# Patient Record
Sex: FEMALE | Race: WHITE | NOT HISPANIC OR LATINO | Employment: FULL TIME | URBAN - METROPOLITAN AREA
[De-identification: names, ages, dates, MRNs, and addresses within clinical notes are randomized per-mention and may not be internally consistent; named-entity substitution may affect disease eponyms.]

---

## 2017-04-04 ASSESSMENT — ENCOUNTER SYMPTOMS
SEIZURES: 0
HEADACHES: 0
WEAKNESS: 1
TREMORS: 0
TINGLING: 1
LOSS OF CONSCIOUSNESS: 0
PARALYSIS: 0
DIZZINESS: 0
DISTURBANCES IN COORDINATION: 0
NUMBNESS: 1
MEMORY LOSS: 0
SPEECH CHANGE: 0

## 2017-04-06 ASSESSMENT — ENCOUNTER SYMPTOMS
WEIGHT GAIN: 0
HYPERTENSION: 0
RECTAL BLEEDING: 0
ARTHRALGIAS: 0
INCREASED ENERGY: 0
ORTHOPNEA: 0
WEAKNESS: 1
NUMBNESS: 1
NERVOUS/ANXIOUS: 0
TACHYCARDIA: 0
COUGH: 0
BREAST MASS: 0
NECK MASS: 0
DISTURBANCES IN COORDINATION: 0
ABDOMINAL PAIN: 0
EYE PAIN: 0
VOMITING: 0
TREMORS: 0
DIARRHEA: 0
POOR WOUND HEALING: 0
PARALYSIS: 0
SNORES LOUDLY: 0
SLEEP DISTURBANCES DUE TO BREATHING: 0
FEVER: 0
SPUTUM PRODUCTION: 0
BLOATING: 0
BLOOD IN STOOL: 0
SINUS PAIN: 0
EYE WATERING: 0
SPEECH CHANGE: 0
HEMOPTYSIS: 0
COUGH DISTURBING SLEEP: 0
TROUBLE SWALLOWING: 0
HEARTBURN: 0
MUSCLE WEAKNESS: 0
EXERCISE INTOLERANCE: 0
SYNCOPE: 0
CLAUDICATION: 0
EYE IRRITATION: 0
CHILLS: 0
DOUBLE VISION: 0
SKIN CHANGES: 0
HALLUCINATIONS: 0
SHORTNESS OF BREATH: 0
POLYDIPSIA: 0
ALTERED TEMPERATURE REGULATION: 0
DECREASED APPETITE: 0
DIZZINESS: 0
CONSTIPATION: 0
NECK PAIN: 0
BACK PAIN: 0
LOSS OF CONSCIOUSNESS: 0
HEMATURIA: 0
NIGHT SWEATS: 0
MYALGIAS: 0
DYSURIA: 0
EYE REDNESS: 0
SMELL DISTURBANCE: 0
HEADACHES: 0
HOT FLASHES: 0
DYSPNEA ON EXERTION: 0
WEIGHT LOSS: 0
HYPOTENSION: 0
STIFFNESS: 0
MEMORY LOSS: 0
LEG SWELLING: 0
RECTAL PAIN: 0
JOINT SWELLING: 0
PALPITATIONS: 0
INSOMNIA: 0
DEPRESSION: 0
JAUNDICE: 0
BOWEL INCONTINENCE: 0
WHEEZING: 0
DECREASED LIBIDO: 0
DIFFICULTY URINATING: 0
NAIL CHANGES: 0
BRUISES/BLEEDS EASILY: 0
LIGHT-HEADEDNESS: 0
POSTURAL DYSPNEA: 0
FLANK PAIN: 0
PANIC: 0
POLYPHAGIA: 0
SINUS CONGESTION: 0
SEIZURES: 0
BREAST PAIN: 0
DECREASED CONCENTRATION: 0
TASTE DISTURBANCE: 0
FATIGUE: 0
MUSCLE CRAMPS: 0
RESPIRATORY PAIN: 0
SWOLLEN GLANDS: 0
LEG PAIN: 0
HOARSE VOICE: 0
SORE THROAT: 0
NAUSEA: 0
TINGLING: 1

## 2017-04-06 NOTE — PROGRESS NOTES
Follow Up Notes on Referred Patient    Date: 2017       Dr. Loli Wilson MD  [facility]  [address] [address]       RE: Melly Huston  : 1965  NELLI: 2017    Dear Dr. 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 She is here today for a surveillance visit.      History-to-date:   2013: The patient is a 47 year old  female h/o endometrial hyperplasia (complex without atypia on first biopsy, normal on second biopsy) who presented for annual exam 13. Menses were previously managed with Mirena IUD. Over last handful of months, her bleeding with menses has increased to an amount similar to that occuring at the time of the initial IUD insertion. Prior to IUD, bleeding was irregular and almost continuous. At the time of IUD removal and replacement she underwent endometrial biopsy that returned adenocarcinoma; favor endometrial endometrioid FIGO grade II (see pathology report for details).   RADIATION THERAPY AND DOSE: HDR intracavitary brachytherapy, total dose 21 Gy in 3 fractions     13: Sexually active and using lubrication prn. Some deep dyspareunia. Using dilator. No bleeding. Some urinary cramping and constipation but overall symptoms are improving. Multiple family cancers, considering genetic consult. Screening current. Notes intermittent sore throat x months. History of heartburn. Mild lymphedema improving with exercise. Lymphedema Clinic has been helpful. Brother with diagnosis of prostate cancer and patient is very involved and working full time.   3/12/2014: ASCUS negative high-risk HPV. LIZ  14: No abdominal bloating, constipation, diarrhea, pain, vaginal or rectal bleeding, cough or dyspnea. Night time  awakenings and hot flashes. Pt has tried melatonin, benadryl, bed time teas. Overall wishes to avoid medications. Improved sexual response and very mild dyspareunia much improved since last visit. Some mild lower leg lymphedema with occasional use of compression stockings. Bilateral knee pain with reduced activity which has been chronic but most disturbing to pt. Pt considering knee replacement. History of blood clot secondary to surgery with negative coag testing. Priolosec d/c and pt has rare reflux symptoms.      9/17/14: Paps no longer indicated due to negative history of cervical cancer. New lower right-sided pelvic/abdominal pain x 2-3 weeks. No constipation or urinary symptoms. Sexual response had improved then now has declined again. Some increased lymphedema recently but then improved with compression and elevation. Continues to be physically active.      9/18/14: CT IMPRESSION: Simple cyst left pelvic wall fluid collection which most  likely represents post operative seroma. Postsurgical changes of  bilateral salpingo-oophorectomy and hysterectomy.     10/2/14: Impression:  Successful CT guided aspiration of left pelvic fluid collection. 20 cc of yellow thin fluid aspirated and submitted for analysis.    Pelvis, cyst fluid:  -No evidence of malignancy  -Polymorphous lymphocytes (see Comment) Specimen Adequacy: Satisfactory for evaluation.    COMMENT:  Cytologic preparations demonstrate a polymorphous mixture of small and medium lymphocytes. In the setting of previous abdominal lymphadenectomy in a patient who presents with an abdominal cyst, the findings are consistent with a lymphocele.      6/23/15: CT ap Impression:  1. No evidence for recurrent or metastatic disease in the abdomen or pelvis.  2. Previously identified left pelvic wall lymphocele is no longer present.  3. Right lower lobe 3 mm subpleural nodule, unchanged since 8/5/2013.      Today she comes to clinic feeling well. She denies any vaginal  bleeding, no changes in her bowel or bladder habits, no nausea/emesis, no lower extremity edema, and no difficulties eating or sleeping. She denies any abdominal discomfort/bloating, no fevers or chills, and no chest pain or shortness of breath. She is having some right shoulder discomfort causing some tingling/numbness. She is current with her health screenings and is using a lubricant for intercourse which helps. She has seen a PCP since her last visit and reports her cholesterol is a little high so she has been changing her diet and is working on exercise. She is using her dilator about once a month.       Review of Systems     Constitutional:  Negative for fever, chills, weight loss, weight gain, fatigue, decreased appetite, night sweats, recent stressors, height gain, height loss, post-operative complications, incisional pain, hallucinations, increased energy, hyperactivity and confused.   HENT:  Negative for ear pain, hearing loss, tinnitus, nosebleeds, trouble swallowing, hoarse voice, mouth sores, sore throat, ear discharge, tooth pain, gum tenderness, taste disturbance, smell disturbance, hearing aid, bleeding gums, dry mouth, sinus pain, sinus congestion and neck mass.    Eyes:  Negative for double vision, pain, redness, eye pain, decreased vision, eye watering, eye bulging, eye dryness, flashing lights, spots, floaters, strabismus, tunnel vision, jaundice and eye irritation.   Respiratory:   Negative for cough, hemoptysis, sputum production, shortness of breath, wheezing, sleep disturbances due to breathing, snores loudly, respiratory pain, dyspnea on exertion, cough disturbing sleep and postural dyspnea.    Cardiovascular:  Negative for chest pain, dyspnea on exertion, palpitations, orthopnea, claudication, leg swelling, fingers/toes turn blue, hypertension, hypotension, syncope, history of heart murmur, chest pain on exertion, chest pain at rest, pacemaker, few scattered varicosities, leg pain, sleep  disturbances due to breathing, tachycardia, light-headedness, exercise intolerance and edema.   Gastrointestinal:  Negative for heartburn, nausea, vomiting, abdominal pain, diarrhea, constipation, blood in stool, melena, rectal pain, bloating, hemorrhoids, bowel incontinence, jaundice, rectal bleeding, coffee ground emesis and change in stool.   Genitourinary:  Negative for bladder incontinence, dysuria, urgency, hematuria, flank pain, vaginal discharge, difficulty urinating, genital sores, dyspareunia, decreased libido, nocturia, voiding less frequently, arousal difficulty, abnormal vaginal bleeding, excessive menstruation, menstrual changes, hot flashes, vaginal dryness and postmenopausal bleeding.   Musculoskeletal:  Negative for myalgias, back pain, joint swelling, arthralgias, stiffness, muscle cramps, neck pain, bone pain, muscle weakness and fracture.   Skin:  Negative for nail changes, itching, poor wound healing, rash, hair changes, skin changes, acne, warts, poor wound healing, scarring, flaky skin, Raynaud's phenomenon, sensitivity to sunlight and skin thickening.   Neurological:  Positive for tingling, weakness and numbness. Negative for dizziness, tremors, speech change, seizures, loss of consciousness, light-headedness, headaches, disturbances in coordination, memory loss, difficulty walking and paralysis.   Endo/Heme:  Negative for anemia, swollen glands and bruises/bleeds easily.   Psychiatric/Behavioral:  Negative for depression, hallucinations, memory loss, decreased concentration, mood swings and panic attacks.    Breast:  Negative for breast discharge, breast mass, breast pain and nipple retraction.   Endocrine:  Negative for altered temperature regulation, polyphagia, polydipsia, unwanted hair growth and change in facial hair.          Past Medical History:    Past Medical History:   Diagnosis Date     Arthritis      Blood clot in vein 2010    negative coag testing.      Cough      Endometrial  cancer (H)      Endometrial hyperplasia 2009     Gastro-oesophageal reflux disease      PONV (postoperative nausea and vomiting)      Shortness of breath          Past Surgical History:    Past Surgical History:   Procedure Laterality Date     ARTHROPLASTY KNEE Left 3/24/2015    Procedure: ARTHROPLASTY KNEE;  Surgeon: Magan No MD;  Location:  OR     DAVINCI HYSTERECTOMY TOTAL, BILATERAL SALPINGO-OOPHORECTOMY, NODE DISSECTION, COMBINED  8/19/2013    Procedure: COMBINED DAVINCI HYSTERECTOMY TOTAL, SALPINGO-OOPHORECTOMY, NODE DISSECTION;  Davinci Total Laparoscopic Hysterectomy, Bilateral Salpingo-Oophorectomy, Bilateral pelvic and periaortic Lymph Node Dissection, cystoscopy and washings.  Anesthesia General with Pain block ;  Surgeon: Bonny Cruz MD;  Location: [facility] OR     KNEE SURGERY  1998    left acl     REMOVE HARDWARE KNEE  3/24/2015    Procedure: REMOVE HARDWARE KNEE;  Surgeon: Magan No MD;  Location:  OR     ROTATOR CUFF REPAIR RT/LT  2012    Right     WRIST SURGERY  2011    blood clot, right         There are no preventive care reminders to display for this patient.    Current Medications:     Current Outpatient Prescriptions   Medication Sig Dispense Refill     Multiple Vitamins-Minerals (MULTIVITAMIN PO) Take by mouth daily       VITAMIN D, CHOLECALCIFEROL, PO Take by mouth daily       calcium carbonate (OS-MORRIS 500 MG [place]. CA) 500 MG tablet Take 500 mg by mouth 2 times daily           Allergies:      No Known Allergies     Social History:     Social History   Substance Use Topics     Smoking status: Former Smoker     Packs/day: 0.50     Years: 5.00     Quit date: 1/1/1990     Smokeless tobacco: Never Used     Alcohol use Yes      Comment: 1-2 drinks/day       History   Drug Use No         Family History:       Family History   Problem Relation Age of Onset     Arthritis Mother      Arthritis Father      Hypertension Brother      CEREBROVASCULAR DISEASE Mother      Peptic  Ulcer Disease Father      Lipids Mother      Lipids Father      DIABETES Mother      CANCER Maternal Aunt      brain cancer, lung cancer     CANCER Brother       brother, prostate     CANCER Mother      kidney     CANCER Paternal Uncle      prostate x 2         Physical Exam:     /77  Pulse 83  Temp 97  F (36.1  C) (Oral)  Resp 16  Wt 70.6 kg (155 lb 9.6 oz)  LMP 07/10/2013  SpO2 96%  BMI 26.76 kg/m2  Body mass index is 26.76 kg/(m^2).    General Appearance: healthy and alert, no distress     HEENT: no thyromegaly, no palpable nodules or masses        Cardiovascular: regular rate and rhythm, no gallops, rubs or murmurs     Respiratory: lungs clear, no rales, rhonchi or wheezes, normal diaphragmatic excursion    Musculoskeletal: extremities non tender and without edema    Skin: no lesions or rashes     Neurological: normal gait, no gross defects     Psychiatric: appropriate mood and affect                               Hematological: normal cervical, supraclavicular and inguinal lymph nodes     Gastrointestinal:       abdomen soft, non-tender, non-distended, no organomegaly or masses    Genitourinary: External genitalia and urethral meatus appears normal.  Vagina is smooth without nodularity or masses.  Cervix surgically absent.  Bimanual exam reveal no masses, nodularity or fullness.  Recto-vaginal exam confirms these findings.      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 She is here today for a surveillance visit.     15 minutes were spent with this patient, over 50% of that time was spent in symptom management, treatment planning and in counseling and coordination of care.      Plan:     1.)        Patient to RTC in 6 months for her next surveillance visit; she will continue to be seen every 6 months until  10/2018. Continue to use her dilator. Reviewed signs and symptoms for when she should contact the clinic or seek additional care. Patient to contact the clinic with any questions or concerns in the interim.     2.) Genetic risk factors were assessed and the patient does not meet the qualifications for a referral.      3.) Labs and/or tests ordered include:  None today.      4.) Health maintenance issues addressed today include annual health maintenance and non-gynecologic issues with PCP.    JUAN C Schumacher, NP-BC, ANP-BC  Women's Health Nurse Practitioner  Adult Nurse Pracitioner  Gynecologic Oncology          CC  Patient Care Team:  Loli Gonzalez MD as MD (OB/Gyn)  Teresa Johnson APRN CNP (Nurse Practitioner - Women's Health)  LOLI GONZALEZ

## 2017-04-07 ENCOUNTER — ONCOLOGY VISIT (OUTPATIENT)
Dept: ONCOLOGY | Facility: CLINIC | Age: 52
End: 2017-04-07
Attending: NURSE PRACTITIONER
Payer: COMMERCIAL

## 2017-04-07 VITALS
SYSTOLIC BLOOD PRESSURE: 121 MMHG | WEIGHT: 155.6 LBS | TEMPERATURE: 97 F | OXYGEN SATURATION: 96 % | DIASTOLIC BLOOD PRESSURE: 77 MMHG | RESPIRATION RATE: 16 BRPM | BODY MASS INDEX: 26.76 KG/M2 | HEART RATE: 83 BPM

## 2017-04-07 DIAGNOSIS — C54.1 ENDOMETRIAL CANCER (H): Primary | ICD-10-CM

## 2017-04-07 PROCEDURE — 99212 OFFICE O/P EST SF 10 MIN: CPT

## 2017-04-07 PROCEDURE — 99213 OFFICE O/P EST LOW 20 MIN: CPT | Mod: ZP | Performed by: NURSE PRACTITIONER

## 2017-04-07 ASSESSMENT — PAIN SCALES - GENERAL: PAINLEVEL: NO PAIN (0)

## 2017-04-07 NOTE — LETTER
2017       RE: Melly Huston  [address]  [facility] [address]     Dear Colleague,    Thank you for referring your patient, Melly Huston, to the [facility]. Please see a copy of my visit note below.                Follow Up Notes on Referred Patient    Date: 2017       Dr. Loli Wilson MD  [facility]  [address]  [place] [address]       RE: Melly Huston  : 1965  NELLI: 2017    Dear Dr. 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 She is here today for a surveillance visit.      History-to-date:   2013: The patient is a 47 year old  female h/o endometrial hyperplasia (complex without atypia on first biopsy, normal on second biopsy) who presented for annual exam 13. Menses were previously managed with Mirena IUD. Over last handful of months, her bleeding with menses has increased to an amount similar to that occuring at the time of the initial IUD insertion. Prior to IUD, bleeding was irregular and almost continuous. At the time of IUD removal and replacement she underwent endometrial biopsy that returned adenocarcinoma; favor endometrial endometrioid FIGO grade II (see pathology report for details).   RADIATION THERAPY AND DOSE: HDR intracavitary brachytherapy, total dose 21 Gy in 3 fractions     13: Sexually active and using lubrication prn. Some deep dyspareunia. Using dilator. No bleeding. Some urinary cramping and constipation but overall symptoms are improving. Multiple family cancers, considering genetic consult. Screening current. Notes intermittent sore throat x months. History of heartburn. Mild lymphedema improving with exercise. Lymphedema Clinic has been helpful. Brother with  diagnosis of prostate cancer and patient is very involved and working full time.   3/12/2014: ASCUS negative high-risk HPV. LIZ  6/9/14: No abdominal bloating, constipation, diarrhea, pain, vaginal or rectal bleeding, cough or dyspnea. Night time awakenings and hot flashes. Pt has tried melatonin, benadryl, bed time teas. Overall wishes to avoid medications. Improved sexual response and very mild dyspareunia much improved since last visit. Some mild lower leg lymphedema with occasional use of compression stockings. Bilateral knee pain with reduced activity which has been chronic but most disturbing to pt. Pt considering knee replacement. History of blood clot secondary to surgery with negative coag testing. Priolosec d/c and pt has rare reflux symptoms.      9/17/14: Paps no longer indicated due to negative history of cervical cancer. New lower right-sided pelvic/abdominal pain x 2-3 weeks. No constipation or urinary symptoms. Sexual response had improved then now has declined again. Some increased lymphedema recently but then improved with compression and elevation. Continues to be physically active.      9/18/14: CT IMPRESSION: Simple cyst left pelvic wall fluid collection which most  likely represents post operative seroma. Postsurgical changes of  bilateral salpingo-oophorectomy and hysterectomy.     10/2/14: Impression:  Successful CT guided aspiration of left pelvic fluid collection. 20 cc of yellow thin fluid aspirated and submitted for analysis.    Pelvis, cyst fluid:  -No evidence of malignancy  -Polymorphous lymphocytes (see Comment) Specimen Adequacy: Satisfactory for evaluation.    COMMENT:  Cytologic preparations demonstrate a polymorphous mixture of small and medium lymphocytes. In the setting of previous abdominal lymphadenectomy in a patient who presents with an abdominal cyst, the findings are consistent with a lymphocele.      6/23/15: CT ap Impression:  1. No evidence for recurrent or metastatic  disease in the abdomen or pelvis.  2. Previously identified left pelvic wall lymphocele is no longer present.  3. Right lower lobe 3 mm subpleural nodule, unchanged since 8/5/2013.      Today she comes to clinic feeling well. She denies any vaginal bleeding, no changes in her bowel or bladder habits, no nausea/emesis, no lower extremity edema, and no difficulties eating or sleeping. She denies any abdominal discomfort/bloating, no fevers or chills, and no chest pain or shortness of breath. She is having some right shoulder discomfort causing some tingling/numbness. She is current with her health screenings and is using a lubricant for intercourse which helps. She has seen a PCP since her last visit and reports her cholesterol is a little high so she has been changing her diet and is working on exercise. She is using her dilator about once a month.       Review of Systems     Constitutional:  Negative for fever, chills, weight loss, weight gain, fatigue, decreased appetite, night sweats, recent stressors, height gain, height loss, post-operative complications, incisional pain, hallucinations, increased energy, hyperactivity and confused.   HENT:  Negative for ear pain, hearing loss, tinnitus, nosebleeds, trouble swallowing, hoarse voice, mouth sores, sore throat, ear discharge, tooth pain, gum tenderness, taste disturbance, smell disturbance, hearing aid, bleeding gums, dry mouth, sinus pain, sinus congestion and neck mass.    Eyes:  Negative for double vision, pain, redness, eye pain, decreased vision, eye watering, eye bulging, eye dryness, flashing lights, spots, floaters, strabismus, tunnel vision, jaundice and eye irritation.   Respiratory:   Negative for cough, hemoptysis, sputum production, shortness of breath, wheezing, sleep disturbances due to breathing, snores loudly, respiratory pain, dyspnea on exertion, cough disturbing sleep and postural dyspnea.    Cardiovascular:  Negative for chest pain, dyspnea on  exertion, palpitations, orthopnea, claudication, leg swelling, fingers/toes turn blue, hypertension, hypotension, syncope, history of heart murmur, chest pain on exertion, chest pain at rest, pacemaker, few scattered varicosities, leg pain, sleep disturbances due to breathing, tachycardia, light-headedness, exercise intolerance and edema.   Gastrointestinal:  Negative for heartburn, nausea, vomiting, abdominal pain, diarrhea, constipation, blood in stool, melena, rectal pain, bloating, hemorrhoids, bowel incontinence, jaundice, rectal bleeding, coffee ground emesis and change in stool.   Genitourinary:  Negative for bladder incontinence, dysuria, urgency, hematuria, flank pain, vaginal discharge, difficulty urinating, genital sores, dyspareunia, decreased libido, nocturia, voiding less frequently, arousal difficulty, abnormal vaginal bleeding, excessive menstruation, menstrual changes, hot flashes, vaginal dryness and postmenopausal bleeding.   Musculoskeletal:  Negative for myalgias, back pain, joint swelling, arthralgias, stiffness, muscle cramps, neck pain, bone pain, muscle weakness and fracture.   Skin:  Negative for nail changes, itching, poor wound healing, rash, hair changes, skin changes, acne, warts, poor wound healing, scarring, flaky skin, Raynaud's phenomenon, sensitivity to sunlight and skin thickening.   Neurological:  Positive for tingling, weakness and numbness. Negative for dizziness, tremors, speech change, seizures, loss of consciousness, light-headedness, headaches, disturbances in coordination, memory loss, difficulty walking and paralysis.   Endo/Heme:  Negative for anemia, swollen glands and bruises/bleeds easily.   Psychiatric/Behavioral:  Negative for depression, hallucinations, memory loss, decreased concentration, mood swings and panic attacks.    Breast:  Negative for breast discharge, breast mass, breast pain and nipple retraction.   Endocrine:  Negative for altered temperature  regulation, polyphagia, polydipsia, unwanted hair growth and change in facial hair.          Past Medical History:    Past Medical History:   Diagnosis Date     Arthritis      Blood clot in vein 2010    negative coag testing.      Cough      Endometrial cancer (H)      Endometrial hyperplasia 2009     Gastro-oesophageal reflux disease      PONV (postoperative nausea and vomiting)      Shortness of breath          Past Surgical History:    Past Surgical History:   Procedure Laterality Date     ARTHROPLASTY KNEE Left 3/24/2015    Procedure: ARTHROPLASTY KNEE;  Surgeon: Magan No MD;  Location:  OR     DAVINCI HYSTERECTOMY TOTAL, BILATERAL SALPINGO-OOPHORECTOMY, NODE DISSECTION, COMBINED  8/19/2013    Procedure: COMBINED DAVINCI HYSTERECTOMY TOTAL, SALPINGO-OOPHORECTOMY, NODE DISSECTION;  Davinci Total Laparoscopic Hysterectomy, Bilateral Salpingo-Oophorectomy, Bilateral pelvic and periaortic Lymph Node Dissection, cystoscopy and washings.  Anesthesia General with Pain block ;  Surgeon: Bonny Cruz MD;  Location: [facility] OR     KNEE SURGERY  1998    left acl     REMOVE HARDWARE KNEE  3/24/2015    Procedure: REMOVE HARDWARE KNEE;  Surgeon: Magan No MD;  Location:  OR     ROTATOR CUFF REPAIR RT/LT  2012    Right     WRIST SURGERY  2011    blood clot, right         There are no preventive care reminders to display for this patient.    Current Medications:     Current Outpatient Prescriptions   Medication Sig Dispense Refill     Multiple Vitamins-Minerals (MULTIVITAMIN PO) Take by mouth daily       VITAMIN D, CHOLECALCIFEROL, PO Take by mouth daily       calcium carbonate (OS-MORRIS 500 MG [place]. CA) 500 MG tablet Take 500 mg by mouth 2 times daily           Allergies:      No Known Allergies     Social History:     Social History   Substance Use Topics     Smoking status: Former Smoker     Packs/day: 0.50     Years: 5.00     Quit date: 1/1/1990     Smokeless tobacco: Never Used     Alcohol use Yes       Comment: 1-2 drinks/day       History   Drug Use No         Family History:       Family History   Problem Relation Age of Onset     Arthritis Mother      Arthritis Father      Hypertension Brother      CEREBROVASCULAR DISEASE Mother      Peptic Ulcer Disease Father      Lipids Mother      Lipids Father      DIABETES Mother      CANCER Maternal Aunt      brain cancer, lung cancer     CANCER Brother       brother, prostate     CANCER Mother      kidney     CANCER Paternal Uncle      prostate x 2         Physical Exam:     /77  Pulse 83  Temp 97  F (36.1  C) (Oral)  Resp 16  Wt 70.6 kg (155 lb 9.6 oz)  LMP 07/10/2013  SpO2 96%  BMI 26.76 kg/m2  Body mass index is 26.76 kg/(m^2).    General Appearance: healthy and alert, no distress     HEENT: no thyromegaly, no palpable nodules or masses        Cardiovascular: regular rate and rhythm, no gallops, rubs or murmurs     Respiratory: lungs clear, no rales, rhonchi or wheezes, normal diaphragmatic excursion    Musculoskeletal: extremities non tender and without edema    Skin: no lesions or rashes     Neurological: normal gait, no gross defects     Psychiatric: appropriate mood and affect                               Hematological: normal cervical, supraclavicular and inguinal lymph nodes     Gastrointestinal:       abdomen soft, non-tender, non-distended, no organomegaly or masses    Genitourinary: External genitalia and urethral meatus appears normal.  Vagina is smooth without nodularity or masses.  Cervix surgically absent.  Bimanual exam reveal no masses, nodularity or fullness.  Recto-vaginal exam confirms these findings.      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 She is here today for a surveillance visit.     15 minutes were spent with this patient,  over 50% of that time was spent in symptom management, treatment planning and in counseling and coordination of care.      Plan:     1.)        Patient to RTC in 6 months for her next surveillance visit; she will continue to be seen every 6 months until 10/2018. Continue to use her dilator. Reviewed signs and symptoms for when she should contact the clinic or seek additional care. Patient to contact the clinic with any questions or concerns in the interim.     2.) Genetic risk factors were assessed and the patient does not meet the qualifications for a referral.      3.) Labs and/or tests ordered include:  None today.      4.) Health maintenance issues addressed today include annual health maintenance and non-gynecologic issues with PCP.    JUAN C Schumacher, WHNP-BC, ANP-BC  Women's Health Nurse Practitioner  Adult Nurse Pracitioner  Gynecologic Oncology          CC  Patient Care Team:  Loli Wilson MD as MD (OB/Gyn)

## 2017-04-07 NOTE — NURSING NOTE
"Melly Huston is a 51 year old female who presents for:  Chief Complaint   Patient presents with     Oncology Clinic Visit     Endometrial Cancer        Initial Vitals:  /77  Pulse 83  Temp 97  F (36.1  C) (Oral)  Resp 16  Wt 70.6 kg (155 lb 9.6 oz)  LMP 07/10/2013  SpO2 96%  BMI 26.76 kg/m2 Estimated body mass index is 26.76 kg/(m^2) as calculated from the following:    Height as of 12/20/16: 1.624 m (5' 3.94\").    Weight as of this encounter: 70.6 kg (155 lb 9.6 oz).. Body surface area is 1.78 meters squared. BP completed using cuff size: regular  No Pain (0) Patient's last menstrual period was 07/10/2013. Allergies and medications reviewed.     Medications: Medication refills not needed today.  Pharmacy name entered into [employer]: [facility] [place], [place] - [address] [place] [address] [phone]    Comments: Patient is not having any pain today.     6 minutes for nursing intake (face to face time)   Karloyn Duncan CMA       "

## 2017-04-07 NOTE — MR AVS SNAPSHOT
After Visit Summary   4/7/2017    Melly Huston    MRN: [id number]           Patient Information     Date Of Birth          1965        Visit Information        Provider Department      4/7/2017 7:00 AM Teresa Johnson APRN CNP [facility]        Today's Diagnoses     Endometrial cancer (H)    -  1       Follow-ups after your visit        Follow-up notes from your care team     Return in about 6 months (around 10/7/2017).      Who to contact     If you have questions or need follow up information about today's clinic visit or your schedule please contact [facility] directly at [phone].  Normal or non-critical lab and imaging results will be communicated to you by MyChart, letter or phone within 4 business days after the clinic has received the results. If you do not hear from us within 7 days, please contact the clinic through [employer] or phone. If you have a critical or abnormal lab result, we will notify you by phone as soon as possible.  Submit refill requests through [employer] or call your pharmacy and they will forward the refill request to us. Please allow 3 business days for your refill to be completed.          Additional Information About Your Visit        MyChart Information     [employer] gives you secure access to your electronic health record. If you see a primary care provider, you can also send messages to your care team and make appointments. If you have questions, please call your primary care clinic.  If you do not have a primary care provider, please call [phone] and they will assist you.        Care EveryWhere ID     This is your Care EveryWhere ID. This could be used by other organizations to access your [place] medical records  [id number]        Your Vitals Were     Pulse Temperature Respirations Last Period Pulse Oximetry BMI (Body Mass Index)    83 97  F (36.1  C) (Oral) 16 07/10/2013 96% 26.76 kg/m2       Blood  Pressure from Last 3 Encounters:   04/07/17 121/77   12/20/16 121/85   09/22/16 121/83    Weight from Last 3 Encounters:   04/07/17 70.6 kg (155 lb 9.6 oz)   12/20/16 71.1 kg (156 lb 11.2 oz)   09/22/16 71 kg (156 lb 9.6 oz)              Today, you had the following     No orders found for display         Today's Medication Changes          These changes are accurate as of: 4/7/17  7:34 AM.  If you have any questions, ask your nurse or doctor.               Stop taking these medicines if you haven't already. Please contact your care team if you have questions.     albuterol (5 MG/ML) 0.5% neb solution   Commonly known as:  PROVENTIL   Stopped by:  Teresa Johnson APRN CNP                    Primary Care Provider    None Specified       No primary provider on file.        Thank you!     Thank you for choosing [facility]  for your care. Our goal is always to provide you with excellent care. Hearing back from our patients is one way we can continue to improve our services. Please take a few minutes to complete the written survey that you may receive in the mail after your visit with us. Thank you!             Your Updated Medication List - Protect others around you: Learn how to safely use, store and throw away your medicines at www.disposemymeds.org.          This list is accurate as of: 4/7/17  7:34 AM.  Always use your most recent med list.                   Brand Name Dispense Instructions for use    calcium carbonate 500 MG tablet    OS-MORRIS 500 mg [place]. [place]     Take 500 mg by mouth 2 times daily       MULTIVITAMIN PO      Take by mouth daily       VITAMIN D (CHOLECALCIFEROL) PO      Take by mouth daily

## 2017-10-13 ASSESSMENT — ENCOUNTER SYMPTOMS
SINUS CONGESTION: 1
NECK MASS: 0
HOARSE VOICE: 1
WHEEZING: 1
TROUBLE SWALLOWING: 1
SPUTUM PRODUCTION: 1
SMELL DISTURBANCE: 0
SHORTNESS OF BREATH: 1
COUGH DISTURBING SLEEP: 1
HEMOPTYSIS: 0
SNORES LOUDLY: 0
COUGH: 1
SINUS PAIN: 1
DYSPNEA ON EXERTION: 1
RESPIRATORY PAIN: 0
TASTE DISTURBANCE: 0
SORE THROAT: 1
POSTURAL DYSPNEA: 1

## 2017-10-26 ENCOUNTER — ONCOLOGY VISIT (OUTPATIENT)
Dept: ONCOLOGY | Facility: CLINIC | Age: 52
End: 2017-10-26
Attending: NURSE PRACTITIONER
Payer: COMMERCIAL

## 2017-10-26 VITALS
SYSTOLIC BLOOD PRESSURE: 128 MMHG | BODY MASS INDEX: 27.66 KG/M2 | RESPIRATION RATE: 16 BRPM | TEMPERATURE: 98.6 F | DIASTOLIC BLOOD PRESSURE: 87 MMHG | HEIGHT: 64 IN | OXYGEN SATURATION: 97 % | HEART RATE: 84 BPM | WEIGHT: 162 LBS

## 2017-10-26 DIAGNOSIS — C54.1 ENDOMETRIAL CANCER (H): Primary | ICD-10-CM

## 2017-10-26 PROCEDURE — 99213 OFFICE O/P EST LOW 20 MIN: CPT | Mod: ZP | Performed by: NURSE PRACTITIONER

## 2017-10-26 PROCEDURE — 99212 OFFICE O/P EST SF 10 MIN: CPT | Mod: ZF

## 2017-10-26 RX ORDER — ALBUTEROL SULFATE 90 UG/1
AEROSOL, METERED RESPIRATORY (INHALATION) DAILY PRN
Refills: 0 | COMMUNITY
Start: 2016-12-20 | End: 2018-11-01

## 2017-10-26 ASSESSMENT — ENCOUNTER SYMPTOMS
NECK MASS: 0
SEIZURES: 0
TINGLING: 0
LEG PAIN: 0
WEAKNESS: 0
SLEEP DISTURBANCES DUE TO BREATHING: 0
ALTERED TEMPERATURE REGULATION: 0
EYE PAIN: 0
POSTURAL DYSPNEA: 1
HOARSE VOICE: 1
DOUBLE VISION: 0
WHEEZING: 1
HOT FLASHES: 0
MUSCLE CRAMPS: 0
SPUTUM PRODUCTION: 1
POOR WOUND HEALING: 0
CHILLS: 0
EYE WATERING: 0
POLYDIPSIA: 0
DIFFICULTY URINATING: 0
SINUS PAIN: 1
ARTHRALGIAS: 0
FEVER: 0
DECREASED APPETITE: 0
WEIGHT LOSS: 0
PALPITATIONS: 0
DECREASED LIBIDO: 0
DYSURIA: 0
EXTREMITY NUMBNESS: 0
BREAST PAIN: 0
DISTURBANCES IN COORDINATION: 0
CONSTIPATION: 0
HYPOTENSION: 0
JAUNDICE: 0
STIFFNESS: 0
MYALGIAS: 0
BRUISES/BLEEDS EASILY: 0
COUGH DISTURBING SLEEP: 1
EXERCISE INTOLERANCE: 0
PARALYSIS: 0
RESPIRATORY PAIN: 0
BREAST MASS: 0
JOINT SWELLING: 0
COUGH: 1
TROUBLE SWALLOWING: 1
INCREASED ENERGY: 0
CLAUDICATION: 0
TACHYCARDIA: 0
LEG SWELLING: 0
DIZZINESS: 0
DIARRHEA: 0
RECTAL PAIN: 0
MEMORY LOSS: 0
HALLUCINATIONS: 0
VOMITING: 0
ORTHOPNEA: 0
NECK PAIN: 0
LOSS OF CONSCIOUSNESS: 0
POLYPHAGIA: 0
SINUS CONGESTION: 1
EYE IRRITATION: 0
FATIGUE: 0
DYSPNEA ON EXERTION: 1
SMELL DISTURBANCE: 0
ABDOMINAL PAIN: 0
MUSCLE WEAKNESS: 0
BACK PAIN: 0
NUMBNESS: 0
HYPERTENSION: 0
PANIC: 0
SWOLLEN GLANDS: 0
FLANK PAIN: 0
HEMOPTYSIS: 0
NERVOUS/ANXIOUS: 0
SORE THROAT: 1
SPEECH CHANGE: 0
NIGHT SWEATS: 0
DEPRESSION: 0
HEADACHES: 0
RECTAL BLEEDING: 0
DECREASED CONCENTRATION: 0
TASTE DISTURBANCE: 0
BLOOD IN STOOL: 0
NAIL CHANGES: 0
INSOMNIA: 0
HEMATURIA: 0
TREMORS: 0
SKIN CHANGES: 0
SNORES LOUDLY: 0
LIGHT-HEADEDNESS: 0
SYNCOPE: 0
HEARTBURN: 0
BLOATING: 0
NAUSEA: 0
WEIGHT GAIN: 0
BOWEL INCONTINENCE: 0
EYE REDNESS: 0

## 2017-10-26 ASSESSMENT — PAIN SCALES - GENERAL: PAINLEVEL: NO PAIN (0)

## 2017-10-26 NOTE — NURSING NOTE
"Oncology Rooming Note    October 26, 2017 9:37 AM   Melly Huston is a 52 year old female who presents for:    Chief Complaint   Patient presents with     Oncology Clinic Visit     follow up- Endometrial CA follow up     Initial Vitals: Ht 1.624 m (5' 3.94\")  LMP 07/10/2013 Estimated body mass index is 26.76 kg/(m^2) as calculated from the following:    Height as of 12/20/16: 1.624 m (5' 3.94\").    Weight as of 4/7/17: 70.6 kg (155 lb 9.6 oz). There is no height or weight on file to calculate BSA.  No Pain (0) Comment: Data Unavailable   Patient's last menstrual period was 07/10/2013.  Allergies reviewed: Yes  Medications reviewed: Yes    Medications: Medication refills not needed today.  Pharmacy name entered into [employer]: [facility] [place], [place] - [address] [place] [address] [phone]    Clinical concerns: No new concerns at this time.    10 minutes for nursing intake (face to face time)     Lucita Bravo LPN            "

## 2017-10-26 NOTE — LETTER
10/26/2017     RE: Melly Huston  [address]  [facility] [address]     Dear Colleague,    Thank you for referring your patient, Melly Huston, to the [facility]. Please see a copy of my visit note below.                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 She is here today for a surveillance visit.       History-to-date:   2013: The patient is a 47 year old  female h/o endometrial hyperplasia (complex without atypia on first biopsy, normal on second biopsy) who presented for annual exam 13. Menses were previously managed with Mirena IUD. Over last handful of months, her bleeding with menses has increased to an amount similar to that occuring at the time of the initial IUD insertion. Prior to IUD, bleeding was irregular and almost continuous. At the time of IUD removal and replacement she underwent endometrial biopsy that returned adenocarcinoma; favor endometrial endometrioid FIGO grade II (see pathology report for details).   RADIATION THERAPY AND DOSE: HDR intracavitary brachytherapy, total dose 21 Gy in 3 fractions     13: Sexually active and using lubrication prn. Some deep dyspareunia. Using dilator. No bleeding. Some urinary cramping and constipation but overall symptoms are improving. Multiple family cancers, considering genetic consult. Screening current. Notes intermittent sore throat x months. History of heartburn. Mild lymphedema improving with exercise. Lymphedema Clinic has been helpful. Brother with diagnosis of prostate cancer and patient is very involved and working full time.   3/12/2014: ASCUS negative high-risk HPV. LIZ  14: No  abdominal bloating, constipation, diarrhea, pain, vaginal or rectal bleeding, cough or dyspnea. Night time awakenings and hot flashes. Pt has tried melatonin, benadryl, bed time teas. Overall wishes to avoid medications. Improved sexual response and very mild dyspareunia much improved since last visit. Some mild lower leg lymphedema with occasional use of compression stockings. Bilateral knee pain with reduced activity which has been chronic but most disturbing to pt. Pt considering knee replacement. History of blood clot secondary to surgery with negative coag testing. Priolosec d/c and pt has rare reflux symptoms.       9/17/14: Paps no longer indicated due to negative history of cervical cancer. New lower right-sided pelvic/abdominal pain x 2-3 weeks. No constipation or urinary symptoms. Sexual response had improved then now has declined again. Some increased lymphedema recently but then improved with compression and elevation. Continues to be physically active.       9/18/14: CT IMPRESSION: Simple cyst left pelvic wall fluid collection which most  likely represents post operative seroma. Postsurgical changes of  bilateral salpingo-oophorectomy and hysterectomy.      10/2/14: Impression:  Successful CT guided aspiration of left pelvic fluid collection. 20 cc of yellow thin fluid aspirated and submitted for analysis.    Pelvis, cyst fluid:  -No evidence of malignancy  -Polymorphous lymphocytes (see Comment) Specimen Adequacy: Satisfactory for evaluation.    COMMENT:  Cytologic preparations demonstrate a polymorphous mixture of small and medium lymphocytes. In the setting of previous abdominal lymphadenectomy in a patient who presents with an abdominal cyst, the findings are consistent with a lymphocele.       6/23/15: CT ap Impression:  1. No evidence for recurrent or metastatic disease in the abdomen or pelvis.  2. Previously identified left pelvic wall lymphocele is no longer present.  3. Right lower lobe 3 mm  subpleural nodule, unchanged since 8/5/2013.        Today she comes to clinic reporting she is recovering from a cold which triggered an asthmatic response for which she was using an inhaler. She reports her symptoms are nearly resolved. She denies any vaginal bleeding, no changes in her bowel or bladder habits, no nausea/emesis, no lower extremity edema, and no difficulties eating or sleeping. She denies any abdominal discomfort/bloating, no fevers or chills, and no chest pain or shortness of breath. She is using her dilator about once a month and is intermittently sexually active. She is due for her annual and mammogram in December and her colonoscopy is due next year.         Review of Systems     Constitutional:  Negative for fever, chills, weight loss, weight gain, fatigue, decreased appetite, night sweats, recent stressors, height gain, height loss, post-operative complications, incisional pain, hallucinations, increased energy, hyperactivity and confused.   HENT:  Positive for trouble swallowing, hoarse voice, sore throat, sinus pain and sinus congestion. Negative for ear pain, hearing loss, tinnitus, nosebleeds, mouth sores, ear discharge, tooth pain, gum tenderness, taste disturbance, smell disturbance, hearing aid, bleeding gums, dry mouth and neck mass.    Eyes:  Negative for double vision, pain, redness, eye pain, decreased vision, eye watering, eye bulging, eye dryness, flashing lights, spots, floaters, strabismus, tunnel vision, jaundice and eye irritation.   Respiratory:   Positive for cough, sputum production, wheezing, dyspnea on exertion, cough disturbing sleep and postural dyspnea. Negative for hemoptysis, sleep disturbances due to breathing, snores loudly and respiratory pain.    Cardiovascular:  Positive for dyspnea on exertion. Negative for chest pain, palpitations, orthopnea, claudication, leg swelling, fingers/toes turn blue, hypertension, hypotension, syncope, history of heart murmur, chest  pain on exertion, chest pain at rest, pacemaker, few scattered varicosities, leg pain, sleep disturbances due to breathing, tachycardia, light-headedness, exercise intolerance and edema.   Gastrointestinal:  Negative for heartburn, nausea, vomiting, abdominal pain, diarrhea, constipation, blood in stool, melena, rectal pain, bloating, hemorrhoids, bowel incontinence, jaundice, rectal bleeding, coffee ground emesis and change in stool.   Genitourinary:  Negative for bladder incontinence, dysuria, urgency, hematuria, flank pain, vaginal discharge, difficulty urinating, genital sores, dyspareunia, decreased libido, nocturia, voiding less frequently, arousal difficulty, abnormal vaginal bleeding, excessive menstruation, menstrual changes, hot flashes, vaginal dryness and postmenopausal bleeding.   Musculoskeletal:  Negative for myalgias, back pain, joint swelling, arthralgias, stiffness, muscle cramps, neck pain, bone pain, muscle weakness and fracture.   Skin:  Negative for nail changes, itching, poor wound healing, rash, hair changes, skin changes, acne, warts, poor wound healing, scarring, flaky skin, Raynaud's phenomenon, sensitivity to sunlight and skin thickening.   Neurological:  Negative for dizziness, tingling, tremors, speech change, seizures, loss of consciousness, weakness, light-headedness, numbness, headaches, disturbances in coordination, extremity numbness, memory loss, difficulty walking and paralysis.   Endo/Heme:  Negative for anemia, swollen glands and bruises/bleeds easily.   Psychiatric/Behavioral:  Negative for depression, hallucinations, memory loss, decreased concentration, mood swings and panic attacks.    Breast:  Negative for breast discharge, breast mass, breast pain and nipple retraction.   Endocrine:  Negative for altered temperature regulation, polyphagia, polydipsia, unwanted hair growth and change in facial hair.          Past Medical History:    Past Medical History:   Diagnosis Date      Arthritis      Blood clot in vein 2010    negative coag testing.      Cough      Endometrial cancer (H)      Endometrial hyperplasia 2009     Gastro-oesophageal reflux disease      PONV (postoperative nausea and vomiting)      Shortness of breath          Past Surgical History:    Past Surgical History:   Procedure Laterality Date     ARTHROPLASTY KNEE Left 3/24/2015    Procedure: ARTHROPLASTY KNEE;  Surgeon: Magan No MD;  Location:  OR     DAVINCI HYSTERECTOMY TOTAL, BILATERAL SALPINGO-OOPHORECTOMY, NODE DISSECTION, COMBINED  8/19/2013    Procedure: COMBINED DAVINCI HYSTERECTOMY TOTAL, SALPINGO-OOPHORECTOMY, NODE DISSECTION;  Davinci Total Laparoscopic Hysterectomy, Bilateral Salpingo-Oophorectomy, Bilateral pelvic and periaortic Lymph Node Dissection, cystoscopy and washings.  Anesthesia General with Pain block ;  Surgeon: Bonny Cruz MD;  Location: [facility] OR     KNEE SURGERY  1998    left acl     REMOVE HARDWARE KNEE  3/24/2015    Procedure: REMOVE HARDWARE KNEE;  Surgeon: Magan No MD;  Location:  OR     ROTATOR CUFF REPAIR RT/LT  2012    Right     WRIST SURGERY  2011    blood clot, right         Health Maintenance Due   Topic Date Due     INFLUENZA VACCINE (SYSTEM ASSIGNED)  09/01/2017       Current Medications:     Current Outpatient Prescriptions   Medication Sig Dispense Refill     Multiple Vitamins-Minerals (MULTIVITAMIN PO) Take by mouth daily       VITAMIN D, CHOLECALCIFEROL, PO Take by mouth daily       calcium carbonate (OS-MORRIS 500 MG [place] [employer]. [place]) 500 MG tablet Take 500 mg by mouth 2 times daily       VENTOLIN  (90 BASE) MCG/ACT Inhaler daily as needed  0         Allergies:      No Known Allergies     Social History:     Social History   Substance Use Topics     Smoking status: Former Smoker     Packs/day: 0.50     Years: 5.00     Quit date: 1/1/1990     Smokeless tobacco: Never Used     Alcohol use Yes      Comment: 1-2 drinks/day       History   Drug Use No  "        Family History:       Family History   Problem Relation Age of Onset     Arthritis Mother      Arthritis Father      Hypertension Brother      CEREBROVASCULAR DISEASE Mother      Peptic Ulcer Disease Father      Lipids Mother      Lipids Father      DIABETES Mother      CANCER Maternal Aunt      brain cancer, lung cancer     CANCER Brother       brother, prostate     CANCER Mother      kidney     CANCER Paternal Uncle      prostate x 2         Physical Exam:     /87  Pulse 84  Temp 98.6  F (37  C) (Oral)  Resp 16  Ht 1.624 m (5' 3.94\")  Wt 73.5 kg (162 lb)  LMP 07/10/2013  SpO2 97%  BMI 27.86 kg/m2  Body mass index is 27.86 kg/(m^2).    General Appearance: healthy and alert, no distress     HEENT: no thyromegaly, no palpable nodules or masses        Cardiovascular: regular rate and rhythm, no gallops, rubs or murmurs     Respiratory: lungs clear, no rales, rhonchi or wheezes, normal diaphragmatic excursion    Musculoskeletal: extremities non tender and without edema    Skin: no lesions or rashes     Neurological: normal gait, no gross defects     Psychiatric: appropriate mood and affect                               Hematological: normal cervical, supraclavicular and inguinal lymph nodes     Gastrointestinal:       abdomen soft, non-tender, non-distended, no organomegaly or masses    Genitourinary: External genitalia and urethral meatus appears normal.  Vagina is smooth without nodularity or masses.  Cervix surgically absent.  Bimanual exam reveal no masses, nodularity or fullness.  Recto-vaginal exam confirms these findings.      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 She is here today for a surveillance visit.     20 minutes were spent with this patient, over 50% of that time was spent in " symptom management, treatment planning and in counseling and coordination of care.      Plan:     1.)        Patient to RTC in 6 months for her next surveillance visit. Reviewed recommendations from [facility] not to perform surveillance pap smears in women diagnosed with endometrial cancer as this does not improve detection of local recurrence.  Reviewed signs and symptoms for when she should contact the clinic or seek additional care. Patient to contact the clinic with any questions or concerns in the interim.     2.) Genetic risk factors were assessed and the patient does not meet the qualifications for a referral.  Discussed the GOLD study with her and she is interested in this . True will meet with her today. She has enrolled.     3.) Labs and/or tests ordered include:  None today.      4.) Health maintenance issues addressed today include annual health maintenance and non-gynecologic issues with PCP.    JUAN C Schumacher, NP-BC, ANP-BC  Women's Health Nurse Practitioner  Adult Nurse Pracitioner  Gynecologic Oncology    CC  Patient Care Team:  No Ref-Primary, Physician as PCP - General  Loli Wilson MD as MD (OB/Gyn)  Teresa Johnson APRN CNP (Nurse Practitioner - Women's Health)  SELF, REFERRED

## 2017-10-26 NOTE — MR AVS SNAPSHOT
After Visit Summary   10/26/2017    Melly Huston    MRN: [id number]           Patient Information     Date Of Birth          1965        Visit Information        Provider Department      10/26/2017 10:00 AM Teresa Johnson APRN CNP [facility]        Today's Diagnoses     Endometrial cancer (H)    -  1       Follow-ups after your visit        Follow-up notes from your care team     Return in about 6 months (around 4/26/2018).      Your next 10 appointments already scheduled     Apr 26, 2018 10:00 AM CDT   (Arrive by 9:45 AM)   Return Visit with JUAN C Garcia CNP   [facility]    [address]  [address] [place] [address] [phone]              Who to contact     If you have questions or need follow up information about today's clinic visit or your schedule please contact [facility] directly at [phone].  Normal or non-critical lab and imaging results will be communicated to you by [employer], letter or phone within 4 business days after the clinic has received the results. If you do not hear from us within 7 days, please contact the clinic through [employer] or phone. If you have a critical or abnormal lab result, we will notify you by phone as soon as possible.  Submit refill requests through [employer] or call your pharmacy and they will forward the refill request to us. Please allow 3 business days for your refill to be completed.          Additional Information About Your Visit        [employer] Information     [employer] gives you secure access to your electronic health record. If you see a primary care provider, you can also send messages to your care team and make appointments. If you have questions, please call your primary care clinic.  If you do not have a primary care provider, please call [phone] and they will assist you.        Care EveryWhere ID   "   This is your Care EveryWhere ID. This could be used by other organizations to access your [place] medical records  [id number]        Your Vitals Were     Pulse Temperature Respirations Height Last Period Pulse Oximetry    84 98.6  F (37  C) (Oral) 16 1.624 m (5' 3.94\") 07/10/2013 97%    BMI (Body Mass Index)                   27.86 kg/m2            Blood Pressure from Last 3 Encounters:   10/26/17 128/87   04/07/17 121/77   12/20/16 121/85    Weight from Last 3 Encounters:   10/26/17 73.5 kg (162 lb)   04/07/17 70.6 kg (155 lb 9.6 oz)   12/20/16 71.1 kg (156 lb 11.2 oz)              Today, you had the following     No orders found for display       Primary Care Provider    Physician No Ref-Primary       NO REF-PRIMARY PHYSICIAN        Equal Access to Services     [facility]: Hadii pita Sanders, elijah oliva, mauroybta kaalmada steven, pierre bolanos . So [facility] [phone].    ATENCIÓN: Si habla español, tiene a chun disposición servicios gratuitos de asistencia lingüística. Llame al [phone].    We comply with applicable federal civil rights laws and [place] laws. We do not discriminate on the basis of race, color, national origin, age, disability, sex, sexual orientation, or gender identity.            Thank you!     Thank you for choosing [facility]  for your care. Our goal is always to provide you with excellent care. Hearing back from our patients is one way we can continue to improve our services. Please take a few minutes to complete the written survey that you may receive in the mail after your visit with us. Thank you!             Your Updated Medication List - Protect others around you: Learn how to safely use, store and throw away your medicines at www.disposemymeds.org.          This list is accurate as of: 10/26/17 10:59 AM.  Always use your most recent med list.                   Brand Name Dispense Instructions for use Diagnosis "    calcium carbonate 1250 MG tablet    OS-MORRIS 500 mg [place]. Ca     Take 500 mg by mouth 2 times daily        MULTIVITAMIN PO      Take by mouth daily        VENTOLIN  (90 BASE) MCG/ACT Inhaler   Generic drug:  albuterol      daily as needed        VITAMIN D (CHOLECALCIFEROL) PO      Take by mouth daily

## 2017-10-26 NOTE — PROGRESS NOTES
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 She is here today for a surveillance visit.       History-to-date:   2013: The patient is a 47 year old  female h/o endometrial hyperplasia (complex without atypia on first biopsy, normal on second biopsy) who presented for annual exam 13. Menses were previously managed with Mirena IUD. Over last handful of months, her bleeding with menses has increased to an amount similar to that occuring at the time of the initial IUD insertion. Prior to IUD, bleeding was irregular and almost continuous. At the time of IUD removal and replacement she underwent endometrial biopsy that returned adenocarcinoma; favor endometrial endometrioid FIGO grade II (see pathology report for details).   RADIATION THERAPY AND DOSE: HDR intracavitary brachytherapy, total dose 21 Gy in 3 fractions     13: Sexually active and using lubrication prn. Some deep dyspareunia. Using dilator. No bleeding. Some urinary cramping and constipation but overall symptoms are improving. Multiple family cancers, considering genetic consult. Screening current. Notes intermittent sore throat x months. History of heartburn. Mild lymphedema improving with exercise. Lymphedema Clinic has been helpful. Brother with diagnosis of prostate cancer and patient is very involved and working full time.   3/12/2014: ASCUS negative high-risk HPV. LIZ  14: No abdominal bloating, constipation, diarrhea, pain, vaginal or rectal bleeding, cough or dyspnea. Night time awakenings and hot flashes. Pt has tried melatonin, benadryl, bed time teas. Overall wishes to avoid medications. Improved sexual response  and very mild dyspareunia much improved since last visit. Some mild lower leg lymphedema with occasional use of compression stockings. Bilateral knee pain with reduced activity which has been chronic but most disturbing to pt. Pt considering knee replacement. History of blood clot secondary to surgery with negative coag testing. Priolosec d/c and pt has rare reflux symptoms.       9/17/14: Paps no longer indicated due to negative history of cervical cancer. New lower right-sided pelvic/abdominal pain x 2-3 weeks. No constipation or urinary symptoms. Sexual response had improved then now has declined again. Some increased lymphedema recently but then improved with compression and elevation. Continues to be physically active.       9/18/14: CT IMPRESSION: Simple cyst left pelvic wall fluid collection which most  likely represents post operative seroma. Postsurgical changes of  bilateral salpingo-oophorectomy and hysterectomy.      10/2/14: Impression:  Successful CT guided aspiration of left pelvic fluid collection. 20 cc of yellow thin fluid aspirated and submitted for analysis.    Pelvis, cyst fluid:  -No evidence of malignancy  -Polymorphous lymphocytes (see Comment) Specimen Adequacy: Satisfactory for evaluation.    COMMENT:  Cytologic preparations demonstrate a polymorphous mixture of small and medium lymphocytes. In the setting of previous abdominal lymphadenectomy in a patient who presents with an abdominal cyst, the findings are consistent with a lymphocele.       6/23/15: CT ap Impression:  1. No evidence for recurrent or metastatic disease in the abdomen or pelvis.  2. Previously identified left pelvic wall lymphocele is no longer present.  3. Right lower lobe 3 mm subpleural nodule, unchanged since 8/5/2013.        Today she comes to clinic reporting she is recovering from a cold which triggered an asthmatic response for which she was using an inhaler. She reports her symptoms are nearly resolved. She  denies any vaginal bleeding, no changes in her bowel or bladder habits, no nausea/emesis, no lower extremity edema, and no difficulties eating or sleeping. She denies any abdominal discomfort/bloating, no fevers or chills, and no chest pain or shortness of breath. She is using her dilator about once a month and is intermittently sexually active. She is due for her annual and mammogram in December and her colonoscopy is due next year.         Review of Systems     Constitutional:  Negative for fever, chills, weight loss, weight gain, fatigue, decreased appetite, night sweats, recent stressors, height gain, height loss, post-operative complications, incisional pain, hallucinations, increased energy, hyperactivity and confused.   HENT:  Positive for trouble swallowing, hoarse voice, sore throat, sinus pain and sinus congestion. Negative for ear pain, hearing loss, tinnitus, nosebleeds, mouth sores, ear discharge, tooth pain, gum tenderness, taste disturbance, smell disturbance, hearing aid, bleeding gums, dry mouth and neck mass.    Eyes:  Negative for double vision, pain, redness, eye pain, decreased vision, eye watering, eye bulging, eye dryness, flashing lights, spots, floaters, strabismus, tunnel vision, jaundice and eye irritation.   Respiratory:   Positive for cough, sputum production, wheezing, dyspnea on exertion, cough disturbing sleep and postural dyspnea. Negative for hemoptysis, sleep disturbances due to breathing, snores loudly and respiratory pain.    Cardiovascular:  Positive for dyspnea on exertion. Negative for chest pain, palpitations, orthopnea, claudication, leg swelling, fingers/toes turn blue, hypertension, hypotension, syncope, history of heart murmur, chest pain on exertion, chest pain at rest, pacemaker, few scattered varicosities, leg pain, sleep disturbances due to breathing, tachycardia, light-headedness, exercise intolerance and edema.   Gastrointestinal:  Negative for heartburn, nausea,  vomiting, abdominal pain, diarrhea, constipation, blood in stool, melena, rectal pain, bloating, hemorrhoids, bowel incontinence, jaundice, rectal bleeding, coffee ground emesis and change in stool.   Genitourinary:  Negative for bladder incontinence, dysuria, urgency, hematuria, flank pain, vaginal discharge, difficulty urinating, genital sores, dyspareunia, decreased libido, nocturia, voiding less frequently, arousal difficulty, abnormal vaginal bleeding, excessive menstruation, menstrual changes, hot flashes, vaginal dryness and postmenopausal bleeding.   Musculoskeletal:  Negative for myalgias, back pain, joint swelling, arthralgias, stiffness, muscle cramps, neck pain, bone pain, muscle weakness and fracture.   Skin:  Negative for nail changes, itching, poor wound healing, rash, hair changes, skin changes, acne, warts, poor wound healing, scarring, flaky skin, Raynaud's phenomenon, sensitivity to sunlight and skin thickening.   Neurological:  Negative for dizziness, tingling, tremors, speech change, seizures, loss of consciousness, weakness, light-headedness, numbness, headaches, disturbances in coordination, extremity numbness, memory loss, difficulty walking and paralysis.   Endo/Heme:  Negative for anemia, swollen glands and bruises/bleeds easily.   Psychiatric/Behavioral:  Negative for depression, hallucinations, memory loss, decreased concentration, mood swings and panic attacks.    Breast:  Negative for breast discharge, breast mass, breast pain and nipple retraction.   Endocrine:  Negative for altered temperature regulation, polyphagia, polydipsia, unwanted hair growth and change in facial hair.          Past Medical History:    Past Medical History:   Diagnosis Date     Arthritis      Blood clot in vein 2010    negative coag testing.      Cough      Endometrial cancer (H)      Endometrial hyperplasia 2009     Gastro-oesophageal reflux disease      PONV (postoperative nausea and vomiting)      Shortness  of breath          Past Surgical History:    Past Surgical History:   Procedure Laterality Date     ARTHROPLASTY KNEE Left 3/24/2015    Procedure: ARTHROPLASTY KNEE;  Surgeon: Magan No MD;  Location:  OR     DAVINCI HYSTERECTOMY TOTAL, BILATERAL SALPINGO-OOPHORECTOMY, NODE DISSECTION, COMBINED  8/19/2013    Procedure: COMBINED DAVINCI HYSTERECTOMY TOTAL, SALPINGO-OOPHORECTOMY, NODE DISSECTION;  Davinci Total Laparoscopic Hysterectomy, Bilateral Salpingo-Oophorectomy, Bilateral pelvic and periaortic Lymph Node Dissection, cystoscopy and washings.  Anesthesia General with Pain block ;  Surgeon: Bonny Cruz MD;  Location: [facility] OR     KNEE SURGERY  1998    left acl     REMOVE HARDWARE KNEE  3/24/2015    Procedure: REMOVE HARDWARE KNEE;  Surgeon: Magan No MD;  Location:  OR     ROTATOR CUFF REPAIR RT/LT  2012    Right     WRIST SURGERY  2011    blood clot, right         Health Maintenance Due   Topic Date Due     INFLUENZA VACCINE (SYSTEM ASSIGNED)  09/01/2017       Current Medications:     Current Outpatient Prescriptions   Medication Sig Dispense Refill     Multiple Vitamins-Minerals (MULTIVITAMIN PO) Take by mouth daily       VITAMIN D, CHOLECALCIFEROL, PO Take by mouth daily       calcium carbonate (OS-MORRIS 500 MG [place]. [place]) 500 MG tablet Take 500 mg by mouth 2 times daily       VENTOLIN  (90 BASE) MCG/ACT Inhaler daily as needed  0         Allergies:      No Known Allergies     Social History:     Social History   Substance Use Topics     Smoking status: Former Smoker     Packs/day: 0.50     Years: 5.00     Quit date: 1/1/1990     Smokeless tobacco: Never Used     Alcohol use Yes      Comment: 1-2 drinks/day       History   Drug Use No         Family History:       Family History   Problem Relation Age of Onset     Arthritis Mother      Arthritis Father      Hypertension Brother      CEREBROVASCULAR DISEASE Mother      Peptic Ulcer Disease Father      Lipids Mother      Lipids  "Father      DIABETES Mother      CANCER Maternal Aunt      brain cancer, lung cancer     CANCER Brother       brother, prostate     CANCER Mother      kidney     CANCER Paternal Uncle      prostate x 2         Physical Exam:     /87  Pulse 84  Temp 98.6  F (37  C) (Oral)  Resp 16  Ht 1.624 m (5' 3.94\")  Wt 73.5 kg (162 lb)  LMP 07/10/2013  SpO2 97%  BMI 27.86 kg/m2  Body mass index is 27.86 kg/(m^2).    General Appearance: healthy and alert, no distress     HEENT: no thyromegaly, no palpable nodules or masses        Cardiovascular: regular rate and rhythm, no gallops, rubs or murmurs     Respiratory: lungs clear, no rales, rhonchi or wheezes, normal diaphragmatic excursion    Musculoskeletal: extremities non tender and without edema    Skin: no lesions or rashes     Neurological: normal gait, no gross defects     Psychiatric: appropriate mood and affect                               Hematological: normal cervical, supraclavicular and inguinal lymph nodes     Gastrointestinal:       abdomen soft, non-tender, non-distended, no organomegaly or masses    Genitourinary: External genitalia and urethral meatus appears normal.  Vagina is smooth without nodularity or masses.  Cervix surgically absent.  Bimanual exam reveal no masses, nodularity or fullness.  Recto-vaginal exam confirms these findings.      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 She is here today for a surveillance visit.     20 minutes were spent with this patient, over 50% of that time was spent in symptom management, treatment planning and in counseling and coordination of care.      Plan:     1.)        Patient to RTC in 6 months for her next surveillance visit. Reviewed recommendations from [facility] not to perform surveillance pap smears in women " diagnosed with endometrial cancer as this does not improve detection of local recurrence.  Reviewed signs and symptoms for when she should contact the clinic or seek additional care. Patient to contact the clinic with any questions or concerns in the interim.     2.) Genetic risk factors were assessed and the patient does not meet the qualifications for a referral.  Discussed the GOLD study with her and she is interested in this . True will meet with her today. She has enrolled.     3.) Labs and/or tests ordered include:  None today.      4.) Health maintenance issues addressed today include annual health maintenance and non-gynecologic issues with PCP.    JUAN C Schumacher, WHNP-BC, ANP-BC  Women's Health Nurse Practitioner  Adult Nurse Pracitioner  Gynecologic Oncology          CC  Patient Care Team:  No Ref-Primary, Physician as PCP - General  Loli Wilson MD as MD (OB/Gyn)  Teresa Johnson APRN CNP (Nurse Practitioner - Women's Health)  SELF, REFERRED

## 2018-04-19 ASSESSMENT — ENCOUNTER SYMPTOMS
INCREASED ENERGY: 0
POLYPHAGIA: 0
HALLUCINATIONS: 0
WEIGHT GAIN: 1
DECREASED LIBIDO: 0
CHILLS: 0
ALTERED TEMPERATURE REGULATION: 0
FEVER: 0
HOT FLASHES: 1
NIGHT SWEATS: 1
WEIGHT LOSS: 0
DECREASED APPETITE: 0
FATIGUE: 0
POLYDIPSIA: 0

## 2018-04-20 ENCOUNTER — RADIANT APPOINTMENT (OUTPATIENT)
Dept: MAMMOGRAPHY | Facility: CLINIC | Age: 53
End: 2018-04-20
Attending: OBSTETRICS & GYNECOLOGY
Payer: COMMERCIAL

## 2018-04-20 DIAGNOSIS — Z12.31 VISIT FOR SCREENING MAMMOGRAM: ICD-10-CM

## 2018-04-25 ASSESSMENT — ENCOUNTER SYMPTOMS
ALTERED TEMPERATURE REGULATION: 0
HEMATURIA: 0
SLEEP DISTURBANCES DUE TO BREATHING: 0
LEG SWELLING: 0
CONSTIPATION: 0
HYPOTENSION: 0
SNORES LOUDLY: 0
WEIGHT GAIN: 1
POLYDIPSIA: 0
EYE REDNESS: 0
EYE WATERING: 0
WEIGHT LOSS: 0
COUGH DISTURBING SLEEP: 0
SORE THROAT: 0
POSTURAL DYSPNEA: 0
VOMITING: 0
RESPIRATORY PAIN: 0
LOSS OF CONSCIOUSNESS: 0
DISTURBANCES IN COORDINATION: 0
EYE IRRITATION: 0
ARTHRALGIAS: 0
HEMOPTYSIS: 0
MUSCLE WEAKNESS: 0
RECTAL PAIN: 0
FATIGUE: 0
ABDOMINAL PAIN: 0
INCREASED ENERGY: 0
TINGLING: 0
HEADACHES: 0
ORTHOPNEA: 0
SINUS PAIN: 0
STIFFNESS: 0
TACHYCARDIA: 0
NECK PAIN: 0
COUGH: 0
HEARTBURN: 0
SPEECH CHANGE: 0
NAIL CHANGES: 0
BLOATING: 0
DEPRESSION: 0
NIGHT SWEATS: 1
DIFFICULTY URINATING: 0
DYSPNEA ON EXERTION: 0
FLANK PAIN: 0
SWOLLEN GLANDS: 0
PARALYSIS: 0
BRUISES/BLEEDS EASILY: 0
JOINT SWELLING: 0
PALPITATIONS: 0
LIGHT-HEADEDNESS: 0
DIZZINESS: 0
HOT FLASHES: 1
SKIN CHANGES: 0
DIARRHEA: 0
BREAST PAIN: 0
BACK PAIN: 0
HALLUCINATIONS: 0
NECK MASS: 0
MUSCLE CRAMPS: 0
JAUNDICE: 0
FEVER: 0
HYPERTENSION: 0
EXERCISE INTOLERANCE: 0
BLOOD IN STOOL: 0
LEG PAIN: 0
POOR WOUND HEALING: 0
TROUBLE SWALLOWING: 0
HOARSE VOICE: 0
SHORTNESS OF BREATH: 0
SPUTUM PRODUCTION: 0
CLAUDICATION: 0
SYNCOPE: 0
BREAST MASS: 0
MYALGIAS: 0
SINUS CONGESTION: 0
SEIZURES: 0
INSOMNIA: 0
NAUSEA: 0
DECREASED LIBIDO: 0
PANIC: 0
DECREASED CONCENTRATION: 0
DOUBLE VISION: 0
WHEEZING: 0
DECREASED APPETITE: 0
TREMORS: 0
BOWEL INCONTINENCE: 0
NERVOUS/ANXIOUS: 0
POLYPHAGIA: 0
WEAKNESS: 0
MEMORY LOSS: 0
CHILLS: 0
RECTAL BLEEDING: 0
EYE PAIN: 0
TASTE DISTURBANCE: 0
NUMBNESS: 0
EXTREMITY NUMBNESS: 0
DYSURIA: 0
SMELL DISTURBANCE: 0

## 2018-04-25 NOTE — PROGRESS NOTES
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 She is here today for a surveillance visit.       History-to-date:   2013: The patient is a 47 year old  female h/o endometrial hyperplasia (complex without atypia on first biopsy, normal on second biopsy) who presented for annual exam 13. Menses were previously managed with Mirena IUD. Over last handful of months, her bleeding with menses has increased to an amount similar to that occuring at the time of the initial IUD insertion. Prior to IUD, bleeding was irregular and almost continuous. At the time of IUD removal and replacement she underwent endometrial biopsy that returned adenocarcinoma; favor endometrial endometrioid FIGO grade II (see pathology report for details).   RADIATION THERAPY AND DOSE: HDR intracavitary brachytherapy, total dose 21 Gy in 3 fractions     13: Sexually active and using lubrication prn. Some deep dyspareunia. Using dilator. No bleeding. Some urinary cramping and constipation but overall symptoms are improving. Multiple family cancers, considering genetic consult. Screening current. Notes intermittent sore throat x months. History of heartburn. Mild lymphedema improving with exercise. Lymphedema Clinic has been helpful. Brother with diagnosis of prostate cancer and patient is very involved and working full time.   3/12/2014: ASCUS negative high-risk HPV. LIZ  14: No abdominal bloating, constipation, diarrhea, pain, vaginal or rectal bleeding, cough or dyspnea. Night time awakenings and hot flashes. Pt has tried melatonin, benadryl, bed time teas. Overall wishes to avoid medications. Improved sexual response and  very mild dyspareunia much improved since last visit. Some mild lower leg lymphedema with occasional use of compression stockings. Bilateral knee pain with reduced activity which has been chronic but most disturbing to pt. Pt considering knee replacement. History of blood clot secondary to surgery with negative coag testing. Priolosec d/c and pt has rare reflux symptoms.       9/17/14: Paps no longer indicated due to negative history of cervical cancer. New lower right-sided pelvic/abdominal pain x 2-3 weeks. No constipation or urinary symptoms. Sexual response had improved then now has declined again. Some increased lymphedema recently but then improved with compression and elevation. Continues to be physically active.       9/18/14: CT IMPRESSION: Simple cyst left pelvic wall fluid collection which most  likely represents post operative seroma. Postsurgical changes of  bilateral salpingo-oophorectomy and hysterectomy.      10/2/14: Impression:  Successful CT guided aspiration of left pelvic fluid collection. 20 cc of yellow thin fluid aspirated and submitted for analysis.    Pelvis, cyst fluid:  -No evidence of malignancy  -Polymorphous lymphocytes (see Comment) Specimen Adequacy: Satisfactory for evaluation.    COMMENT:  Cytologic preparations demonstrate a polymorphous mixture of small and medium lymphocytes. In the setting of previous abdominal lymphadenectomy in a patient who presents with an abdominal cyst, the findings are consistent with a lymphocele.       6/23/15: CT ap Impression:  1. No evidence for recurrent or metastatic disease in the abdomen or pelvis.  2. Previously identified left pelvic wall lymphocele is no longer present.  3. Right lower lobe 3 mm subpleural nodule, unchanged since 8/5/2013.          Today she comes to clinic and states she is using her dilator or having IC to equal once a week. She had one episode of seeing some light pink on the dilator and some discharge, but this has  resolved. She does have some intermittent lower abdominal discomfort which she notices more when laying on her stomach. She was drinking 5 cups of coffee/day and is trying to cut this down to 2. She denies constipation and states she is voiding about every 4 hours. She denies any vaginal bleeding,no nausea/emesis, no lower extremity edema, and no difficulties eating or sleeping. She denies any abdominal bloating, no fevers or chills, and no chest pain or shortness of breath. Her night sweats have increased recently but not to a point where she desires intervention. She is current with her health screenings with her colonoscopy due this fall.         Review of Systems     Constitutional:  Positive for weight gain and night sweats. Negative for fever, chills, weight loss, fatigue, decreased appetite, recent stressors, height loss, post-operative complications, incisional pain, hallucinations, increased energy, hyperactivity and confused.   HENT:  Negative for ear pain, hearing loss, tinnitus, nosebleeds, trouble swallowing, hoarse voice, mouth sores, sore throat, ear discharge, tooth pain, gum tenderness, taste disturbance, smell disturbance, hearing aid, bleeding gums, dry mouth, sinus pain, sinus congestion and neck mass.    Eyes:  Negative for double vision, pain, redness, eye pain, decreased vision, eye watering, eye bulging, eye dryness, flashing lights, spots, floaters, strabismus, tunnel vision, jaundice and eye irritation.   Respiratory:   Negative for cough, hemoptysis, sputum production, shortness of breath, wheezing, sleep disturbances due to breathing, snores loudly, respiratory pain, dyspnea on exertion, cough disturbing sleep and postural dyspnea.    Cardiovascular:  Negative for chest pain, dyspnea on exertion, palpitations, orthopnea, claudication, leg swelling, fingers/toes turn blue, hypertension, hypotension, syncope, history of heart murmur, chest pain on exertion, chest pain at rest, pacemaker,  few scattered varicosities, leg pain, sleep disturbances due to breathing, tachycardia, light-headedness, exercise intolerance and edema.   Gastrointestinal:  Negative for heartburn, nausea, vomiting, abdominal pain, diarrhea, constipation, blood in stool, melena, rectal pain, bloating, hemorrhoids, bowel incontinence, jaundice, rectal bleeding, coffee ground emesis and change in stool.   Genitourinary:  Positive for vaginal discharge, dyspareunia, hot flashes and vaginal dryness. Negative for bladder incontinence, dysuria, urgency, hematuria, flank pain, difficulty urinating, genital sores, decreased libido, nocturia, voiding less frequently, arousal difficulty, abnormal vaginal bleeding, excessive menstruation, menstrual changes and postmenopausal bleeding.   Musculoskeletal:  Negative for myalgias, back pain, joint swelling, arthralgias, stiffness, muscle cramps, neck pain, bone pain, muscle weakness and fracture.   Skin:  Negative for nail changes, itching, poor wound healing, rash, hair changes, skin changes, acne, warts, poor wound healing, scarring, flaky skin, Raynaud's phenomenon, sensitivity to sunlight and skin thickening.   Neurological:  Negative for dizziness, tingling, tremors, speech change, seizures, loss of consciousness, weakness, light-headedness, numbness, headaches, disturbances in coordination, extremity numbness, memory loss, difficulty walking and paralysis.   Endo/Heme:  Negative for anemia, swollen glands and bruises/bleeds easily.   Psychiatric/Behavioral:  Negative for depression, hallucinations, memory loss, decreased concentration, mood swings and panic attacks.    Breast:  Negative for breast discharge, breast mass, breast pain and nipple retraction.   Endocrine:  Negative for altered temperature regulation, polyphagia, polydipsia, unwanted hair growth and change in facial hair.          Past Medical History:    Past Medical History:   Diagnosis Date     Arthritis      Blood clot in  vein 2010    negative coag testing.      Cough      Endometrial cancer (H)      Endometrial hyperplasia 2009     Gastro-oesophageal reflux disease      PONV (postoperative nausea and vomiting)      Shortness of breath          Past Surgical History:    Past Surgical History:   Procedure Laterality Date     ARTHROPLASTY KNEE Left 3/24/2015    Procedure: ARTHROPLASTY KNEE;  Surgeon: Magan No MD;  Location:  OR     DAVINCI HYSTERECTOMY TOTAL, BILATERAL SALPINGO-OOPHORECTOMY, NODE DISSECTION, COMBINED  8/19/2013    Procedure: COMBINED DAVINCI HYSTERECTOMY TOTAL, SALPINGO-OOPHORECTOMY, NODE DISSECTION;  Davinci Total Laparoscopic Hysterectomy, Bilateral Salpingo-Oophorectomy, Bilateral pelvic and periaortic Lymph Node Dissection, cystoscopy and washings.  Anesthesia General with Pain block ;  Surgeon: Bonny Cruz MD;  Location: [facility] OR     KNEE SURGERY  1998    left acl     REMOVE HARDWARE KNEE  3/24/2015    Procedure: REMOVE HARDWARE KNEE;  Surgeon: Magan No MD;  Location:  OR     ROTATOR CUFF REPAIR RT/LT  2012    Right     WRIST SURGERY  2011    blood clot, right         Health Maintenance Due   Topic Date Due     HIV SCREEN (SYSTEM ASSIGNED)  10/08/1982     INFLUENZA VACCINE (1) 09/01/2017       Current Medications:     Current Outpatient Prescriptions   Medication Sig Dispense Refill     Multiple Vitamins-Minerals (MULTIVITAMIN PO) Take by mouth daily       VENTOLIN  (90 BASE) MCG/ACT Inhaler daily as needed  0     VITAMIN D, CHOLECALCIFEROL, PO Take by mouth daily           Allergies:      No Known Allergies     Social History:     Social History   Substance Use Topics     Smoking status: Former Smoker     Packs/day: 0.50     Years: 5.00     Quit date: 1/1/1990     Smokeless tobacco: Never Used     Alcohol use Yes      Comment: 1-2 drinks/day       History   Drug Use No         Family History:       Family History   Problem Relation Age of Onset     Arthritis Mother       Arthritis Father      Hypertension Brother      CEREBROVASCULAR DISEASE Mother      Peptic Ulcer Disease Father      Lipids Mother      Lipids Father      DIABETES Mother      CANCER Maternal Aunt      brain cancer, lung cancer     CANCER Brother       brother, prostate     CANCER Mother      kidney     CANCER Paternal Uncle      prostate x 2         Physical Exam:     /85  Pulse 85  Temp 98.8  F (37.1  C) (Oral)  Resp 16  Wt 72.2 kg (159 lb 2.8 oz)  LMP 07/10/2013  SpO2 93%  BMI 27.38 kg/m2  Body mass index is 27.38 kg/(m^2).    General Appearance: healthy and alert, no distress     HEENT: no thyromegaly, no palpable nodules or masses        Cardiovascular: regular rate and rhythm, no gallops, rubs or murmurs     Respiratory: lungs clear, no rales, rhonchi or wheezes, normal diaphragmatic excursion    Musculoskeletal: extremities non tender and without edema    Skin: no lesions or rashes     Neurological: normal gait, no gross defects     Psychiatric: appropriate mood and affect                               Hematological: normal cervical, supraclavicular and inguinal lymph nodes     Gastrointestinal:       abdomen soft, non-tender, non-distended, no organomegaly or masses; very minimal tenderness on deep palpation    Genitourinary: External genitalia and urethral meatus appears normal.  Vagina is smooth without nodularity or masses.  Cervix surgically absent.  Bimanual exam reveal no masses, nodularity or fullness.  Recto-vaginal exam confirms these findings.      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 She is here today for a surveillance visit.     20 minutes were spent with this patient, over 50% of that time was spent in symptom management, treatment planning and in counseling and coordination of  care.      Plan:     1.)        Patient to RTC in 6 months for her next surveillance visit which will be at her 5 year point; she can then extend her surveillance to annually with a pelvic/rectal exam. Continue to monitor abdominal discomfort and if it becomes consistent or does not improve with fluid recommendations, consider a CT ap. Reviewed signs and symptoms for when she should contact the clinic or seek additional care. Patient to contact the clinic with any questions or concerns in the interim.     2.) Genetic risk factors were assessed and the patient does not meet the qualifications for a referral.      3.) Labs and/or tests ordered include:  None today.      4.) Health maintenance issues addressed today include annual health maintenance and non-gynecologic issues with PCP.    JUAN C Schumacher, WHNP-BC, ANP-BC  Women's Health Nurse Practitioner  Adult Nurse Pracitioner  Division of Gynecologic Oncology          CC  Patient Care Team:  No Ref-Primary, Physician as PCP - General  Loli Wilson MD as MD (OB/Gyn)  Teresa Johnson APRN CNP (Nurse Practitioner - Women's Health)  SELF, REFERRED

## 2018-04-26 ENCOUNTER — ONCOLOGY VISIT (OUTPATIENT)
Dept: ONCOLOGY | Facility: CLINIC | Age: 53
End: 2018-04-26
Attending: NURSE PRACTITIONER
Payer: COMMERCIAL

## 2018-04-26 VITALS
TEMPERATURE: 98.8 F | SYSTOLIC BLOOD PRESSURE: 123 MMHG | RESPIRATION RATE: 16 BRPM | HEART RATE: 85 BPM | OXYGEN SATURATION: 93 % | WEIGHT: 159.17 LBS | DIASTOLIC BLOOD PRESSURE: 85 MMHG | BODY MASS INDEX: 27.38 KG/M2

## 2018-04-26 DIAGNOSIS — C54.1 ENDOMETRIAL CANCER (H): Primary | ICD-10-CM

## 2018-04-26 PROCEDURE — G0463 HOSPITAL OUTPT CLINIC VISIT: HCPCS

## 2018-04-26 PROCEDURE — 99213 OFFICE O/P EST LOW 20 MIN: CPT | Mod: ZP | Performed by: NURSE PRACTITIONER

## 2018-04-26 ASSESSMENT — PAIN SCALES - GENERAL: PAINLEVEL: MILD PAIN (3)

## 2018-04-26 NOTE — MR AVS SNAPSHOT
After Visit Summary   4/26/2018    Melly Huston    MRN: [id number]           Patient Information     Date Of Birth          1965        Visit Information        Provider Department      4/26/2018 10:00 AM Teresa Johnson APRN CNP [facility]        Today's Diagnoses     Endometrial cancer (H)    -  1       Follow-ups after your visit        Follow-up notes from your care team     Return in about 6 months (around 10/26/2018).      Your next 10 appointments already scheduled     Oct 30, 2018 10:00 AM CDT   (Arrive by 9:45 AM)   Return Visit with JUAN C Garcia CNP   [facility] ([facility])    [address]  [address]  [place] [address] [phone]              Who to contact     If you have questions or need follow up information about today's clinic visit or your schedule please contact [facility] directly at [phone].  Normal or non-critical lab and imaging results will be communicated to you by [employer], letter or phone within 4 business days after the clinic has received the results. If you do not hear from us within 7 days, please contact the clinic through orderTalkt or phone. If you have a critical or abnormal lab result, we will notify you by phone as soon as possible.  Submit refill requests through anywayanyday or call your pharmacy and they will forward the refill request to us. Please allow 3 business days for your refill to be completed.          Additional Information About Your Visit        [employer] Information     anywayanyday gives you secure access to your electronic health record. If you see a primary care provider, you can also send messages to your care team and make appointments. If you have questions, please call your primary care clinic.  If you do not have a primary care provider, please call [phone] and they will assist you.        Care EveryWhere ID      This is your Care EveryWhere ID. This could be used by other organizations to access your [place] medical records  [id number]        Your Vitals Were     Pulse Temperature Respirations Last Period Pulse Oximetry BMI (Body Mass Index)    85 98.8  F (37.1  C) (Oral) 16 07/10/2013 93% 27.38 kg/m2       Blood Pressure from Last 3 Encounters:   04/26/18 123/85   10/26/17 128/87   04/07/17 121/77    Weight from Last 3 Encounters:   04/26/18 72.2 kg (159 lb 2.8 oz)   10/26/17 73.5 kg (162 lb)   04/07/17 70.6 kg (155 lb 9.6 oz)              Today, you had the following     No orders found for display       Primary Care Provider Fax #    Physician No Ref-Primary [phone]       No address on file        Equal Access to Services     MINH TAYLOR : Indy Sanders, elijah oliva, jermaine harris, pierre bolanos . So [facility] [phone].    ATENCIÓN: Si habla español, tiene a chun disposición servicios gratuitos de asistencia lingüística. Llame al [phone].    We comply with applicable federal civil rights laws and [place] laws. We do not discriminate on the basis of race, color, national origin, age, disability, sex, sexual orientation, or gender identity.            Thank you!     Thank you for choosing [facility]  for your care. Our goal is always to provide you with excellent care. Hearing back from our patients is one way we can continue to improve our services. Please take a few minutes to complete the written survey that you may receive in the mail after your visit with us. Thank you!             Your Updated Medication List - Protect others around you: Learn how to safely use, store and throw away your medicines at www.disposemymeds.org.          This list is accurate as of 4/26/18 12:46 PM.  Always use your most recent med list.                   Brand Name Dispense Instructions for use Diagnosis    MULTIVITAMIN PO      Take by mouth daily         VENTOLIN  (90 Base) MCG/ACT Inhaler   Generic drug:  albuterol      daily as needed        VITAMIN D (CHOLECALCIFEROL) PO      Take by mouth daily

## 2018-04-26 NOTE — LETTER
2018       RE: Melly Huston  [address] [facility] [address]     Dear Colleague,    Thank you for referring your patient, Melly Huston, to the [facility]. Please see a copy of my visit note below.                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 She is here today for a surveillance visit.       History-to-date:   2013: The patient is a 47 year old  female h/o endometrial hyperplasia (complex without atypia on first biopsy, normal on second biopsy) who presented for annual exam 13. Menses were previously managed with Mirena IUD. Over last handful of months, her bleeding with menses has increased to an amount similar to that occuring at the time of the initial IUD insertion. Prior to IUD, bleeding was irregular and almost continuous. At the time of IUD removal and replacement she underwent endometrial biopsy that returned adenocarcinoma; favor endometrial endometrioid FIGO grade II (see pathology report for details).   RADIATION THERAPY AND DOSE: HDR intracavitary brachytherapy, total dose 21 Gy in 3 fractions     13: Sexually active and using lubrication prn. Some deep dyspareunia. Using dilator. No bleeding. Some urinary cramping and constipation but overall symptoms are improving. Multiple family cancers, considering genetic consult. Screening current. Notes intermittent sore throat x months. History of heartburn. Mild lymphedema improving with exercise. Lymphedema Clinic has been helpful. Brother with diagnosis of prostate cancer and patient is very involved and working full time.   3/12/2014: ASCUS negative high-risk HPV. LIZ  14: No  abdominal bloating, constipation, diarrhea, pain, vaginal or rectal bleeding, cough or dyspnea. Night time awakenings and hot flashes. Pt has tried melatonin, benadryl, bed time teas. Overall wishes to avoid medications. Improved sexual response and very mild dyspareunia much improved since last visit. Some mild lower leg lymphedema with occasional use of compression stockings. Bilateral knee pain with reduced activity which has been chronic but most disturbing to pt. Pt considering knee replacement. History of blood clot secondary to surgery with negative coag testing. Priolosec d/c and pt has rare reflux symptoms.       9/17/14: Paps no longer indicated due to negative history of cervical cancer. New lower right-sided pelvic/abdominal pain x 2-3 weeks. No constipation or urinary symptoms. Sexual response had improved then now has declined again. Some increased lymphedema recently but then improved with compression and elevation. Continues to be physically active.       9/18/14: CT IMPRESSION: Simple cyst left pelvic wall fluid collection which most  likely represents post operative seroma. Postsurgical changes of  bilateral salpingo-oophorectomy and hysterectomy.      10/2/14: Impression:  Successful CT guided aspiration of left pelvic fluid collection. 20 cc of yellow thin fluid aspirated and submitted for analysis.    Pelvis, cyst fluid:  -No evidence of malignancy  -Polymorphous lymphocytes (see Comment) Specimen Adequacy: Satisfactory for evaluation.    COMMENT:  Cytologic preparations demonstrate a polymorphous mixture of small and medium lymphocytes. In the setting of previous abdominal lymphadenectomy in a patient who presents with an abdominal cyst, the findings are consistent with a lymphocele.       6/23/15: CT ap Impression:  1. No evidence for recurrent or metastatic disease in the abdomen or pelvis.  2. Previously identified left pelvic wall lymphocele is no longer present.  3. Right lower lobe 3 mm  subpleural nodule, unchanged since 8/5/2013.          Today she comes to clinic and states she is using her dilator or having IC to equal once a week. She had one episode of seeing some light pink on the dilator and some discharge, but this has resolved. She does have some intermittent lower abdominal discomfort which she notices more when laying on her stomach. She was drinking 5 cups of coffee/day and is trying to cut this down to 2. She denies constipation and states she is voiding about every 4 hours. She denies any vaginal bleeding,no nausea/emesis, no lower extremity edema, and no difficulties eating or sleeping. She denies any abdominal bloating, no fevers or chills, and no chest pain or shortness of breath. Her night sweats have increased recently but not to a point where she desires intervention. She is current with her health screenings with her colonoscopy due this fall.         Review of Systems     Constitutional:  Positive for weight gain and night sweats. Negative for fever, chills, weight loss, fatigue, decreased appetite, recent stressors, height loss, post-operative complications, incisional pain, hallucinations, increased energy, hyperactivity and confused.   HENT:  Negative for ear pain, hearing loss, tinnitus, nosebleeds, trouble swallowing, hoarse voice, mouth sores, sore throat, ear discharge, tooth pain, gum tenderness, taste disturbance, smell disturbance, hearing aid, bleeding gums, dry mouth, sinus pain, sinus congestion and neck mass.    Eyes:  Negative for double vision, pain, redness, eye pain, decreased vision, eye watering, eye bulging, eye dryness, flashing lights, spots, floaters, strabismus, tunnel vision, jaundice and eye irritation.   Respiratory:   Negative for cough, hemoptysis, sputum production, shortness of breath, wheezing, sleep disturbances due to breathing, snores loudly, respiratory pain, dyspnea on exertion, cough disturbing sleep and postural dyspnea.     Cardiovascular:  Negative for chest pain, dyspnea on exertion, palpitations, orthopnea, claudication, leg swelling, fingers/toes turn blue, hypertension, hypotension, syncope, history of heart murmur, chest pain on exertion, chest pain at rest, pacemaker, few scattered varicosities, leg pain, sleep disturbances due to breathing, tachycardia, light-headedness, exercise intolerance and edema.   Gastrointestinal:  Negative for heartburn, nausea, vomiting, abdominal pain, diarrhea, constipation, blood in stool, melena, rectal pain, bloating, hemorrhoids, bowel incontinence, jaundice, rectal bleeding, coffee ground emesis and change in stool.   Genitourinary:  Positive for vaginal discharge, dyspareunia, hot flashes and vaginal dryness. Negative for bladder incontinence, dysuria, urgency, hematuria, flank pain, difficulty urinating, genital sores, decreased libido, nocturia, voiding less frequently, arousal difficulty, abnormal vaginal bleeding, excessive menstruation, menstrual changes and postmenopausal bleeding.   Musculoskeletal:  Negative for myalgias, back pain, joint swelling, arthralgias, stiffness, muscle cramps, neck pain, bone pain, muscle weakness and fracture.   Skin:  Negative for nail changes, itching, poor wound healing, rash, hair changes, skin changes, acne, warts, poor wound healing, scarring, flaky skin, Raynaud's phenomenon, sensitivity to sunlight and skin thickening.   Neurological:  Negative for dizziness, tingling, tremors, speech change, seizures, loss of consciousness, weakness, light-headedness, numbness, headaches, disturbances in coordination, extremity numbness, memory loss, difficulty walking and paralysis.   Endo/Heme:  Negative for anemia, swollen glands and bruises/bleeds easily.   Psychiatric/Behavioral:  Negative for depression, hallucinations, memory loss, decreased concentration, mood swings and panic attacks.    Breast:  Negative for breast discharge, breast mass, breast pain and  nipple retraction.   Endocrine:  Negative for altered temperature regulation, polyphagia, polydipsia, unwanted hair growth and change in facial hair.          Past Medical History:    Past Medical History:   Diagnosis Date     Arthritis      Blood clot in vein 2010    negative coag testing.      Cough      Endometrial cancer (H)      Endometrial hyperplasia 2009     Gastro-oesophageal reflux disease      PONV (postoperative nausea and vomiting)      Shortness of breath          Past Surgical History:    Past Surgical History:   Procedure Laterality Date     ARTHROPLASTY KNEE Left 3/24/2015    Procedure: ARTHROPLASTY KNEE;  Surgeon: aMgan No MD;  Location:  OR     DAVINCI HYSTERECTOMY TOTAL, BILATERAL SALPINGO-OOPHORECTOMY, NODE DISSECTION, COMBINED  8/19/2013    Procedure: COMBINED DAVINCI HYSTERECTOMY TOTAL, SALPINGO-OOPHORECTOMY, NODE DISSECTION;  Davinci Total Laparoscopic Hysterectomy, Bilateral Salpingo-Oophorectomy, Bilateral pelvic and periaortic Lymph Node Dissection, cystoscopy and washings.  Anesthesia General with Pain block ;  Surgeon: Bonny Cruz MD;  Location: [facility] OR     KNEE SURGERY  1998    left acl     REMOVE HARDWARE KNEE  3/24/2015    Procedure: REMOVE HARDWARE KNEE;  Surgeon: Magan No MD;  Location:  OR     ROTATOR CUFF REPAIR RT/LT  2012    Right     WRIST SURGERY  2011    blood clot, right         Health Maintenance Due   Topic Date Due     HIV SCREEN (SYSTEM ASSIGNED)  10/08/1982     INFLUENZA VACCINE (1) 09/01/2017       Current Medications:     Current Outpatient Prescriptions   Medication Sig Dispense Refill     Multiple Vitamins-Minerals (MULTIVITAMIN PO) Take by mouth daily       VENTOLIN  (90 BASE) MCG/ACT Inhaler daily as needed  0     VITAMIN D, CHOLECALCIFEROL, PO Take by mouth daily           Allergies:      No Known Allergies     Social History:     Social History   Substance Use Topics     Smoking status: Former Smoker     Packs/day: 0.50      Years: 5.00     Quit date: 1/1/1990     Smokeless tobacco: Never Used     Alcohol use Yes      Comment: 1-2 drinks/day       History   Drug Use No         Family History:       Family History   Problem Relation Age of Onset     Arthritis Mother      Arthritis Father      Hypertension Brother      CEREBROVASCULAR DISEASE Mother      Peptic Ulcer Disease Father      Lipids Mother      Lipids Father      DIABETES Mother      CANCER Maternal Aunt      brain cancer, lung cancer     CANCER Brother       brother, prostate     CANCER Mother      kidney     CANCER Paternal Uncle      prostate x 2         Physical Exam:     /85  Pulse 85  Temp 98.8  F (37.1  C) (Oral)  Resp 16  Wt 72.2 kg (159 lb 2.8 oz)  LMP 07/10/2013  SpO2 93%  BMI 27.38 kg/m2  Body mass index is 27.38 kg/(m^2).    General Appearance: healthy and alert, no distress     HEENT: no thyromegaly, no palpable nodules or masses        Cardiovascular: regular rate and rhythm, no gallops, rubs or murmurs     Respiratory: lungs clear, no rales, rhonchi or wheezes, normal diaphragmatic excursion    Musculoskeletal: extremities non tender and without edema    Skin: no lesions or rashes     Neurological: normal gait, no gross defects     Psychiatric: appropriate mood and affect                               Hematological: normal cervical, supraclavicular and inguinal lymph nodes     Gastrointestinal:       abdomen soft, non-tender, non-distended, no organomegaly or masses; very minimal tenderness on deep palpation    Genitourinary: External genitalia and urethral meatus appears normal.  Vagina is smooth without nodularity or masses.  Cervix surgically absent.  Bimanual exam reveal no masses, nodularity or fullness.  Recto-vaginal exam confirms these findings.      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 She is here today for a surveillance visit.     20 minutes were spent with this patient, over 50% of that time was spent in symptom management, treatment planning and in counseling and coordination of care.      Plan:     1.)        Patient to RTC in 6 months for her next surveillance visit which will be at her 5 year point; she can then extend her surveillance to annually with a pelvic/rectal exam. Continue to monitor abdominal discomfort and if it becomes consistent or does not improve with fluid recommendations, consider a CT ap. Reviewed signs and symptoms for when she should contact the clinic or seek additional care. Patient to contact the clinic with any questions or concerns in the interim.     2.) Genetic risk factors were assessed and the patient does not meet the qualifications for a referral.      3.) Labs and/or tests ordered include:  None today.      4.) Health maintenance issues addressed today include annual health maintenance and non-gynecologic issues with PCP.    JUAN C Schumacher, WHNP-BC, ANP-BC  Women's Health Nurse Practitioner  Adult Nurse Pracitioner  Division of Gynecologic Oncology      CC  Patient Care Team:  No Ref-Primary, Physician as PCP - General  Loli Wilson MD as MD (OB/Gyn)  Teresa Johnson APRN CNP (Nurse Practitioner - Women's Health)

## 2018-04-26 NOTE — NURSING NOTE
"Oncology Rooming Note    April 26, 2018 9:43 AM   Melly Huston is a 52 year old female who presents for:    Chief Complaint   Patient presents with     Oncology Clinic Visit     Return for Endometrial Ca      Initial Vitals: LMP 07/10/2013 Estimated body mass index is 27.86 kg/(m^2) as calculated from the following:    Height as of 10/26/17: 1.624 m (5' 3.94\").    Weight as of 10/26/17: 73.5 kg (162 lb). There is no height or weight on file to calculate BSA.  Data Unavailable Comment: Data Unavailable   Patient's last menstrual period was 07/10/2013.  Allergies reviewed: Yes  Medications reviewed: Yes    Medications: Medication refills not needed today.  Pharmacy name entered into [facility]: [facility] [place], [place] - [address] [facility] [phone]    Clinical concerns: Pelvic floor pain when sleeping  Elizabeth was notified.    8  minutes for nursing intake (face to face time)     Jaquelin Lewis MA              "

## 2018-08-17 ENCOUNTER — OFFICE VISIT (OUTPATIENT)
Dept: ORTHOPEDICS | Facility: CLINIC | Age: 53
End: 2018-08-17
Payer: COMMERCIAL

## 2018-08-17 ENCOUNTER — RADIANT APPOINTMENT (OUTPATIENT)
Dept: GENERAL RADIOLOGY | Facility: CLINIC | Age: 53
End: 2018-08-17
Attending: FAMILY MEDICINE
Payer: COMMERCIAL

## 2018-08-17 ENCOUNTER — THERAPY VISIT (OUTPATIENT)
Dept: PHYSICAL THERAPY | Facility: CLINIC | Age: 53
End: 2018-08-17
Attending: FAMILY MEDICINE
Payer: COMMERCIAL

## 2018-08-17 VITALS — HEIGHT: 64 IN | SYSTOLIC BLOOD PRESSURE: 131 MMHG | HEART RATE: 80 BPM | DIASTOLIC BLOOD PRESSURE: 79 MMHG

## 2018-08-17 DIAGNOSIS — M89.8X1 PAIN OF RIGHT SCAPULA: ICD-10-CM

## 2018-08-17 DIAGNOSIS — M25.511 ACUTE PAIN OF RIGHT SHOULDER: Primary | ICD-10-CM

## 2018-08-17 DIAGNOSIS — M25.511 ACUTE PAIN OF RIGHT SHOULDER: ICD-10-CM

## 2018-08-17 DIAGNOSIS — M25.511 SHOULDER PAIN, RIGHT: Primary | ICD-10-CM

## 2018-08-17 PROCEDURE — 97110 THERAPEUTIC EXERCISES: CPT | Mod: GP | Performed by: PHYSICAL THERAPIST

## 2018-08-17 PROCEDURE — 97161 PT EVAL LOW COMPLEX 20 MIN: CPT | Mod: GP | Performed by: PHYSICAL THERAPIST

## 2018-08-17 NOTE — PROGRESS NOTES
SPORTS & ORTHOPEDIC WALK-IN VISIT 8/17/2018    Primary Care Physician:      History of Rotator cuff repair. Sore for about a week, week and a half. Been getting progressively worse. Couldn't use a mouse this morning at her computer because of the pain. No injury. Posterior shoulder going down tricep.     Reason for visit:     What part of your body is injured / painful?  right shoulder    What caused the injury /pain? No inciting event     How long ago did your injury occur or pain begin? a week ago    What are your most bothersome symptoms? Pain, Numbness and Tingling    How would you characterize your symptom?  aching    What makes your symptoms better? Nothing - has tried ibuprofen and ice    What makes your symptoms worse? Other: holding arm up     Have you been previously seen for this problem? No    Medical History:    Any recent changes to your medical history? No    Any new medication prescribed since last visit? No    Have you had surgery on this body part before? Yes Date 4/8/2010, Surgical Procedure: Rotator cuff repair    Social History:    Occupation: admin at [employer]    Handedness: Right    Exercise: biking, walking    Review of Systems:    Do you have fever, chills, weight loss? No    Do you have any vision problems? No    Do you have any chest pain or edema? No    Do you have any shortness of breath or wheezing?  No    Do you have stomach problems? No    Do you have any numbness or focal weakness? Yes, tingling in right arm    Do you have diabetes? No    Do you have problems with bleeding or clotting? No    Do you have an rashes or other skin lesions? Yes, on finger, not new

## 2018-08-17 NOTE — PROGRESS NOTES
SUBJECTIVE: Melly Huston is a RHD 52 year old female who presents with right shoulder pain.  Started about 1.5 weeks ago.  No trauma or falls.  Had a lot of radiation- pelvic.  No DM or thyroid disease.  Achy and thought she slept on it wrong.  Aches and can't hold the arm up to do a keyboard.  This shoulder surgery was in 2010, [place] into her shoulder for RC repair.  Can't remember where surgery was, [facility]?  [place] No- knees twice.  Lots of hardware in her.  Medial border of scapula, posterior shoulder, wraps around to the axillary  Family reunion, coolers of food, lazy this summer  Achy and can't sleep well.  Taking some Ibuprofen, more ice    PAST MEDICAL, SOCIAL, SURGICAL AND FAMILY HISTORY: She  has a past medical history of Arthritis; Blood clot in vein (2010); Cough; Endometrial cancer (H); Endometrial hyperplasia (2009); Gastro-oesophageal reflux disease; PONV (postoperative nausea and vomiting); and Shortness of breath.  She  has a past surgical history that includes knee surgery (1998); Wrist surgery (2011); rotator cuff repair rt/lt (2012); DaVINCI hysterectomy total, bilateral salpingo-oophorectomy, node dissection, combined (8/19/2013); Arthroplasty knee (Left, 3/24/2015); and Remove hardware knee (3/24/2015).  Her family history includes Arthritis in her father and mother; Cancer in her brother, maternal aunt, mother, and paternal uncle; Cerebrovascular Disease in her mother; Diabetes in her mother; Hypertension in her brother; Lipids in her father and mother; Peptic Ulcer Disease in her father.  She reports that she quit smoking about 28 years ago. She has a 2.50 pack-year smoking history. She has never used smokeless tobacco. She reports that she drinks alcohol. She reports that she does not use illicit drugs.      ALLERGIES: She has No Known Allergies.    CURRENT MEDICATIONS: She has a current medication list which includes the following prescription(s): multiple vitamins-minerals, ventolin hfa,  "and cholecalciferol.     REVIEW OF SYSTEMS: 10 point review of systems is negative except as noted above.    EXAM:  /79  Pulse 80  Ht 1.626 m (5' 4\")  LMP 07/10/2013  CONSTITUTIONIAL: healthy, alert, no distress and cooperative  HEAD: Normocephalic. No masses, lesions, tenderness or abnormalities  ENT: ENT exam normal, no neck nodes or sinus tenderness  SKIN: no suspicious lesions or rashes  GAIT: normal  Stance: normal  NEUROLOGIC: Normal muscle tone and strength, reflexes normal, sensation grossly normal.  PSYCHIATRIC: affect normal/bright and mentation appears normal.    MUSCULOSKELETAL: right shoulder pain    Palpation:  Non-tender SC joint, clavicle, AC joint, acromion, subacromial space, proximal bicep tendon and upper trapezius muscle   Tender: serratus anterior, scapula  Range of Motion        Active:all normal        Passive: all normal  Strength: rotator cuff strength intact  Special tests: Negative Neer's test, Negative Garcia, Negative Cross-Arm adduction, Negative Olson's        ASSESSMENT/PLAN:  Pt is a 51 yo female with PMHx of endometrial cancer presenting with right shoulder pain  1. Right shoulder pain- scapula and serratus anterior pain- PT suggested      X-RAY INTERPRETATION:   X-Ray of the Right Shoulder: 3-view, ap, y, axillary  ordered and interpreted in the office today was positive for Impression:  1. Postsurgical changes of rotator cuff repair and distal clavicle  excision.  2. Rotator cuff arthropathy with superior migration of the humeral  head and mild osteoarthrosis of the glenohumeral joint.  "

## 2018-08-17 NOTE — LETTER
8/17/2018       RE: Melly Huston  [address] [facility] [address]     Dear Colleague,    Thank you for referring your patient, Melly Huston, to the [facility] SPORTS AND ORTHOPAEDIC WALK IN CLINIC at [facility]. Please see a copy of my visit note below.          SPORTS & ORTHOPEDIC WALK-IN VISIT 8/17/2018    Primary Care Physician:      History of Rotator cuff repair. Sore for about a week, week and a half. Been getting progressively worse. Couldn't use a mouse this morning at her computer because of the pain. No injury. Posterior shoulder going down tricep.     Reason for visit:     What part of your body is injured / painful?  right shoulder    What caused the injury /pain? No inciting event     How long ago did your injury occur or pain begin? a week ago    What are your most bothersome symptoms? Pain, Numbness and Tingling    How would you characterize your symptom?  aching    What makes your symptoms better? Nothing - has tried ibuprofen and ice    What makes your symptoms worse? Other: holding arm up     Have you been previously seen for this problem? No    Medical History:    Any recent changes to your medical history? No    Any new medication prescribed since last visit? No    Have you had surgery on this body part before? Yes Date 4/8/2010, Surgical Procedure: Rotator cuff repair    Social History:    Occupation: admin at [employer]    Handedness: Right    Exercise: biking, walking       SUBJECTIVE: Melly Huston is a RHD 52 year old female who presents with right shoulder pain.  Started about 1.5 weeks ago.  No trauma or falls.  Had a lot of radiation- pelvic.  No DM or thyroid disease.  Achy and thought she slept on it wrong.  Aches and can't hold the arm up to do a keyboard.  This shoulder surgery was in 2010, [place] into her shoulder for RC repair.  Can't remember where surgery was, [facility]?  [place] No- knees twice.  Lots of hardware in her.  Medial  "border of scapula, posterior shoulder, wraps around to the axillary  Family reunion, coolers of food, lazy this summer  Achy and can't sleep well.  Taking some Ibuprofen, more ice    PAST MEDICAL, SOCIAL, SURGICAL AND FAMILY HISTORY: She  has a past medical history of Arthritis; Blood clot in vein (2010); Cough; Endometrial cancer (H); Endometrial hyperplasia (2009); Gastro-oesophageal reflux disease; PONV (postoperative nausea and vomiting); and Shortness of breath.  She  has a past surgical history that includes knee surgery (1998); Wrist surgery (2011); rotator cuff repair rt/lt (2012); DaVINCI hysterectomy total, bilateral salpingo-oophorectomy, node dissection, combined (8/19/2013); Arthroplasty knee (Left, 3/24/2015); and Remove hardware knee (3/24/2015).  Her family history includes Arthritis in her father and mother; Cancer in her brother, maternal aunt, mother, and paternal uncle; Cerebrovascular Disease in her mother; Diabetes in her mother; Hypertension in her brother; Lipids in her father and mother; Peptic Ulcer Disease in her father.  She reports that she quit smoking about 28 years ago. She has a 2.50 pack-year smoking history. She has never used smokeless tobacco. She reports that she drinks alcohol. She reports that she does not use illicit drugs.      ALLERGIES: She has No Known Allergies.    CURRENT MEDICATIONS: She has a current medication list which includes the following prescription(s): multiple vitamins-minerals, ventolin hfa, and cholecalciferol.     REVIEW OF SYSTEMS: 10 point review of systems is negative except as noted above.    EXAM:  /79  Pulse 80  Ht 1.626 m (5' 4\")  LMP 07/10/2013  CONSTITUTIONIAL: healthy, alert, no distress and cooperative  HEAD: Normocephalic. No masses, lesions, tenderness or abnormalities  ENT: ENT exam normal, no neck nodes or sinus tenderness  SKIN: no suspicious lesions or rashes  GAIT: normal  Stance: normal  NEUROLOGIC: Normal muscle tone and " strength, reflexes normal, sensation grossly normal.  PSYCHIATRIC: affect normal/bright and mentation appears normal.    MUSCULOSKELETAL: right shoulder pain    Palpation:  Non-tender SC joint, clavicle, AC joint, acromion, subacromial space, proximal bicep tendon and upper trapezius muscle   Tender: serratus anterior, scapula  Range of Motion        Active:all normal        Passive: all normal  Strength: rotator cuff strength intact  Special tests: Negative Neer's test, Negative Garcia, Negative Cross-Arm adduction, Negative Olson's    ASSESSMENT/PLAN:  Pt is a 53 yo female with PMHx of endometrial cancer presenting with right shoulder pain  1. Right shoulder pain- scapula and serratus anterior pain- PT suggested    X-RAY INTERPRETATION:   X-Ray of the Right Shoulder: 3-view, ap, y, axillary  ordered and interpreted in the office today was positive for Impression:  1. Postsurgical changes of rotator cuff repair and distal clavicle  excision.  2. Rotator cuff arthropathy with superior migration of the humeral  head and mild osteoarthrosis of the glenohumeral joint.    Again, thank you for allowing me to participate in the care of your patient.      Sincerely,    Melly Cartwright MD

## 2018-08-17 NOTE — MR AVS SNAPSHOT
After Visit Summary   8/17/2018    Melly Huston    MRN: [id number]           Patient Information     Date Of Birth          1965        Visit Information        Provider Department      8/17/2018 1:40 PM Mj Newberry [facility] Physical Therapy JULIO CESAR        Today's Diagnoses     Shoulder pain, right    -  1    Pain of right scapula           Follow-ups after your visit        Your next 10 appointments already scheduled     Aug 24, 2018 11:00 AM CDT   JULIO CESAR Extremity with Mj Newberry   [facility] Physical Therapy JULIO CESAR ([facility])    [address] [place] [address] 5th [facility] [id number] [phone]            Oct 03, 2018  9:15 AM CDT   (Arrive by 9:00 AM)   Return Walk In Ortho with Melly Cartwright MD   [facility] Sports Medicine ([facility])    [address] [place] [address] [id number] [phone]            Oct 30, 2018 10:00 AM CDT   (Arrive by 9:45 AM)   Return Visit with JUAN C Garcia [facility] ([facility])    [address] [place] [address]  [address]  [facility] [id number] [phone]              Who to contact     If you have questions or need follow up information about today's clinic visit or your schedule please contact [facility] PHYSICAL THERAPY JULIO CESAR directly at [phone].  Normal or non-critical lab and imaging results will be communicated to you by MyChart, letter or phone within 4 business days after the clinic has received the results. If you do not hear from us within 7 days, please contact the clinic through MyChart or phone. If you have a critical or abnormal lab result, we will notify you by phone as soon as possible.  Submit refill requests through [employer] or call your pharmacy and they will forward the refill request to us. Please allow 3 business days for your refill to be completed.          Additional  Information About Your Visit        N-Trighart Information     [employer]MediaNetwork gives you secure access to your electronic health record. If you see a primary care provider, you can also send messages to your care team and make appointments. If you have questions, please call your primary care clinic.  If you do not have a primary care provider, please call [phone] and they will assist you.        Care EveryWhere ID     This is your Care EveryWhere ID. This could be used by other organizations to access your [place] medical records  [id number]        Your Vitals Were     Last Period                   07/10/2013            Blood Pressure from Last 3 Encounters:   08/17/18 131/79   04/26/18 123/85   10/26/17 128/87    Weight from Last 3 Encounters:   04/26/18 72.2 kg (159 lb 2.8 oz)   10/26/17 73.5 kg (162 lb)   04/07/17 70.6 kg (155 lb 9.6 oz)              We Performed the Following     HC PT EVAL, LOW COMPLEXITY     JULIO CESAR INITIAL EVAL REPORT     THERAPEUTIC EXERCISES        Primary Care Provider Fax #    Physician No Ref-Primary [phone]       No address on file        Equal Access to Services     FLACO [facility] : Hadii pita Sanders, walinda oliva, qascarlet harris, pierre bolanos . So [facility] [phone].    ATENCIÓN: Si habla español, tiene a chun disposición servicios gratuitos de asistencia lingüística. Edouard al [phone].    We comply with applicable federal civil rights laws and [place] laws. We do not discriminate on the basis of race, color, national origin, age, disability, sex, sexual orientation, or gender identity.            Thank you!     Thank you for choosing [facility] PHYSICAL THERAPY JULIO CESAR  for your care. Our goal is always to provide you with excellent care. Hearing back from our patients is one way we can continue to improve our services. Please take a few minutes to complete the written survey that you may receive in the mail after your visit with us.  Thank you!             Your Updated Medication List - Protect others around you: Learn how to safely use, store and throw away your medicines at www.disposemymeds.org.          This list is accurate as of 8/17/18  2:25 PM.  Always use your most recent med list.                   Brand Name Dispense Instructions for use Diagnosis    MULTIVITAMIN PO      Take by mouth daily        VENTOLIN  (90 Base) MCG/ACT inhaler   Generic drug:  albuterol      daily as needed        VITAMIN D (CHOLECALCIFEROL) PO      Take by mouth daily

## 2018-08-17 NOTE — MR AVS SNAPSHOT
After Visit Summary   8/17/2018    Melly Huston    MRN: [id number]           Patient Information     Date Of Birth          1965        Visit Information        Provider Department      8/17/2018 9:00 AM Melly Cartwright MD [facility] Sports and Orthopaedic Walk In Clinic        Today's Diagnoses     Acute pain of right shoulder    -  1       Follow-ups after your visit        Additional Services     PHYSICAL THERAPY REFERRAL (Internal)       Physical Therapy Referral                  Follow-up notes from your care team     Return in about 6 weeks (around 9/28/2018), or if symptoms worsen or fail to improve.      Your next 10 appointments already scheduled     Aug 24, 2018 11:00 AM CDT   JULIO CESAR Extremity with Mj Newberry   [facility] Physical Therapy JULIO CESAR ([facility])    [address] [place] [id number] [phone]            Oct 03, 2018  9:15 AM CDT   (Arrive by 9:00 AM)   Return Walk In Ortho with Melly Cartwright MD   [facility] Sports Medicine ([facility] [place])    [address] [place] [address] [id number] [phone]            Oct 30, 2018 10:00 AM CDT   (Arrive by 9:45 AM)   Return Visit with JUAN C Garcia CNP   [facility] ([facility])    [address] [place] [address] [id number] [phone]              Who to contact     Please call your clinic at [phone] to:    Ask questions about your health    Make or cancel appointments    Discuss your medicines    Learn about your test results    Speak to your doctor            Additional Information About Your Visit        MyChart Information     Baiyaxuan[employer] gives you secure access to your electronic health record. If you see a primary care provider, you can also send messages to your care team and make appointments. If you have questions, please  "call your primary care clinic.  If you do not have a primary care provider, please call [phone] and they will assist you.      Snapeee is an electronic gateway that provides easy, online access to your medical records. With S[employer]peee, you can request a clinic appointment, read your test results, renew a prescription or communicate with your care team.     To access your existing account, please contact your [facility] or call [phone] for assistance.        Care EveryWhere ID     This is your Care EveryWhere ID. This could be used by other organizations to access your [place] medical records  [id number]        Your Vitals Were     Pulse Height Last Period             80 5' 4\" (1.626 m) 07/10/2013          Blood Pressure from Last 3 Encounters:   08/17/18 131/79   04/26/18 123/85   10/26/17 128/87    Weight from Last 3 Encounters:   04/26/18 159 lb 2.8 oz (72.2 kg)   10/26/17 162 lb (73.5 kg)   04/07/17 155 lb 9.6 oz (70.6 kg)              We Performed the Following     PHYSICAL THERAPY REFERRAL (Internal)        Primary Care Provider Fax #    Physician No Ref-Primary [phone]       No address on file        Equal Access to Services     MINH TAYLOR : Hadii pita totho Socar, waaxda luqadaha, qaybta kaalmada adeegyada, pierre bolanos . So [facility] [phone].    ATENCIÓN: Si habla español, tiene a chun disposición servicios gratuitos de asistencia lingüística. Llame al [phone].    We comply with applicable federal civil rights laws and [place] laws. We do not discriminate on the basis of race, color, national origin, age, disability, sex, sexual orientation, or gender identity.            Thank you!     Thank you for choosing [facility] SPORTS AND ORTHOPAEDIC WALK IN CLINIC  for your care. Our goal is always to provide you with excellent care. Hearing back from our patients is one way we can continue to improve our services. Please take a " few minutes to complete the written survey that you may receive in the mail after your visit with us. Thank you!             Your Updated Medication List - Protect others around you: Learn how to safely use, store and throw away your medicines at www.disposemymeds.org.          This list is accurate as of 8/17/18  5:03 PM.  Always use your most recent med list.                   Brand Name Dispense Instructions for use Diagnosis    MULTIVITAMIN PO      Take by mouth daily        VENTOLIN  (90 Base) MCG/ACT inhaler   Generic drug:  albuterol      daily as needed        VITAMIN D (CHOLECALCIFEROL) PO      Take by mouth daily

## 2018-08-28 ENCOUNTER — THERAPY VISIT (OUTPATIENT)
Dept: PHYSICAL THERAPY | Facility: CLINIC | Age: 53
End: 2018-08-28
Payer: COMMERCIAL

## 2018-08-28 DIAGNOSIS — M25.511 SHOULDER PAIN, RIGHT: ICD-10-CM

## 2018-08-28 DIAGNOSIS — M89.8X1 PAIN OF RIGHT SCAPULA: Primary | ICD-10-CM

## 2018-08-28 PROCEDURE — 97140 MANUAL THERAPY 1/> REGIONS: CPT | Mod: GP | Performed by: PHYSICAL THERAPY ASSISTANT

## 2018-08-28 PROCEDURE — 97110 THERAPEUTIC EXERCISES: CPT | Mod: GP | Performed by: PHYSICAL THERAPY ASSISTANT

## 2018-09-10 ENCOUNTER — THERAPY VISIT (OUTPATIENT)
Dept: PHYSICAL THERAPY | Facility: CLINIC | Age: 53
End: 2018-09-10
Payer: COMMERCIAL

## 2018-09-10 DIAGNOSIS — M89.8X1 PAIN OF RIGHT SCAPULA: Primary | ICD-10-CM

## 2018-09-10 DIAGNOSIS — M25.511 SHOULDER PAIN, RIGHT: ICD-10-CM

## 2018-09-10 PROCEDURE — 97140 MANUAL THERAPY 1/> REGIONS: CPT | Mod: GP | Performed by: PHYSICAL THERAPIST

## 2018-09-10 PROCEDURE — 97110 THERAPEUTIC EXERCISES: CPT | Mod: GP | Performed by: PHYSICAL THERAPIST

## 2018-09-27 ENCOUNTER — THERAPY VISIT (OUTPATIENT)
Dept: PHYSICAL THERAPY | Facility: CLINIC | Age: 53
End: 2018-09-27
Payer: COMMERCIAL

## 2018-09-27 DIAGNOSIS — M25.511 SHOULDER PAIN, RIGHT: ICD-10-CM

## 2018-09-27 DIAGNOSIS — M89.8X1 PAIN OF RIGHT SCAPULA: Primary | ICD-10-CM

## 2018-09-27 PROCEDURE — 97110 THERAPEUTIC EXERCISES: CPT | Mod: GP | Performed by: PHYSICAL THERAPIST

## 2018-09-27 NOTE — MR AVS SNAPSHOT
After Visit Summary   9/27/2018    Melly Huston    MRN: [id number]           Patient Information     Date Of Birth          1965        Visit Information        Provider Department      9/27/2018 7:00 AM Mj Newberry [facility]        Today's Diagnoses     Pain of right scapula    -  1    Shoulder pain, right           Follow-ups after your visit        Your next 10 appointments already scheduled     Oct 03, 2018  9:15 AM CDT   (Arrive by 9:00 AM)   Return Walk In Ortho with Melly Cartwright MD   [facility] Sports Medicine [facility] [place])    [address]  5th Floor  [facility] [id number] [phone]            Oct 30, 2018 10:00 AM CDT   (Arrive by 9:45 AM)   Return Visit with JUAN C Garcia [facility] ([facility])    [address]  [address]  [facility] [phone] [phone]              Who to contact     If you have questions or need follow up information about today's clinic visit or your schedule please contact [place] [facility] directly at [phone].  Normal or non-critical lab and imaging results will be communicated to you by MyChart, letter or phone within 4 business days after the clinic has received the results. If you do not hear from us within 7 days, please contact the clinic through MyChart or phone. If you have a critical or abnormal lab result, we will notify you by phone as soon as possible.  Submit refill requests through [employer] or call your pharmacy and they will forward the refill request to us. Please allow 3 business days for your refill to be completed.          Additional Information About Your Visit        MyChart Information     Syllabuster gives you secure access to your electronic health record. If you see a primary care provider, you can also send messages to your care team and  make appointments. If you have questions, please call your primary care clinic.  If you do not have a primary care provider, please call [phone] and they will assist you.        Care EveryWhere ID     This is your Care EveryWhere ID. This could be used by other organizations to access your [place] medical records  [id number]        Your Vitals Were     Last Period                   07/10/2013            Blood Pressure from Last 3 Encounters:   08/17/18 131/79   04/26/18 123/85   10/26/17 128/87    Weight from Last 3 Encounters:   04/26/18 72.2 kg (159 lb 2.8 oz)   10/26/17 73.5 kg (162 lb)   04/07/17 70.6 kg (155 lb 9.6 oz)              We Performed the Following     THERAPEUTIC EXERCISES        Primary Care Provider Fax #    Physician No Ref-Primary [phone]       No address on file        Equal Access to Services     FLACO [facility] : Hadnerissa Sanders, elijah oliva, jermaine wadealmalyric harris, pierre bolanos . So [facility] [phone].    ATENCIÓN: Si habla español, tiene a chun disposición servicios gratuitos de asistencia lingüística. Llame al [phone].    We comply with applicable federal civil rights laws and [place] laws. We do not discriminate on the basis of race, color, national origin, age, disability, sex, sexual orientation, or gender identity.            Thank you!     Thank you for choosing [place] [facility] [place]  for your care. Our goal is always to provide you with excellent care. Hearing back from our patients is one way we can continue to improve our services. Please take a few minutes to complete the written survey that you may receive in the mail after your visit with us. Thank you!             Your Updated Medication List - Protect others around you: Learn how to safely use, store and throw away your medicines at www.disposemymeds.org.          This list is accurate as of 9/27/18  7:44 AM.  Always use your most recent med  list.                   Brand Name Dispense Instructions for use Diagnosis    MULTIVITAMIN PO      Take by mouth daily        VENTOLIN  (90 Base) MCG/ACT inhaler   Generic drug:  albuterol      daily as needed        VITAMIN D (CHOLECALCIFEROL) PO      Take by mouth daily

## 2018-09-27 NOTE — PROGRESS NOTES
Subjective:  HPI                    Objective:  System    Physical Exam    General     ROS    Assessment/Plan:    PROGRESS  REPORT    Progress reporting period is from 8/17 to 9/27.       SUBJECTIVE  Subjective changes noted by patient:  Pt reports feeling 90+% improved. She is back to swimming with no issues and work is going well. Cooking is completely normal and she does not feel consistently throughout the day anymore. The mornings are worse typically, but the only time she feels any tingling is if she has slept in a position with the arm in adduction or if she leans on her hands while biking. This improves with sitting more upright.           Current pain level is 0-1/10  .     Previous pain level was  6/10  .   Changes in function:  Yes (See Goal flowsheet attached for changes in current functional level)  Adverse reaction to treatment or activity: None    OBJECTIVE  Changes noted in objective findings:  Yes, see below:    *Cervical ROM: WNL all directions, 80 degrees rotation BL no pain  *Shoulder ROM: WNL elevation (slight abduction on R compared to L during forward elevation)    *Shoulder ER MMT: 4+/5 on R with arm at side, 5/5 L, 5/5 BL in 90 degrees abduction    *Able to swim 30 minutes with no sx, back to cooking and reaching in all directions with no radicular sx and just slight discomfort in scapula that does not linger.          ASSESSMENT/PLAN  Updated problem list and treatment plan: Diagnosis 1:  R shoulder pain  Decreased strength - therapeutic exercise and therapeutic activities  Impaired muscle performance - neuro re-education  Impaired posture - neuro re-education  STG/LTGs have been met or progress has been made towards goals:  Yes (See Goal flow sheet completed today.)  Assessment of Progress: The patient's condition is improving.  The patient has met all of their long term goals.  Self Management Plans:  Patient has been instructed in a home treatment program.  Patient is independent in a  home treatment program.  Patient  has been instructed in self management of symptoms.  I have re-evaluated this patient and find that the nature, scope, duration and intensity of the therapy is appropriate for the medical condition of the patient.  Melly continues to require the following intervention to meet STG and LTG's:  PT    Recommendations:  This patient is ready to be discharged from therapy and continue their home treatment program.    Please refer to the daily flowsheet for treatment today, total treatment time and time spent performing 1:1 timed codes.

## 2018-10-31 ASSESSMENT — ENCOUNTER SYMPTOMS
FEVER: 0
DYSPNEA ON EXERTION: 0
SORE THROAT: 0
POOR WOUND HEALING: 0
PARALYSIS: 0
SWOLLEN GLANDS: 0
HOT FLASHES: 0
INSOMNIA: 0
NERVOUS/ANXIOUS: 0
SLEEP DISTURBANCES DUE TO BREATHING: 0
TASTE DISTURBANCE: 0
SYNCOPE: 0
WHEEZING: 0
BACK PAIN: 0
WEIGHT GAIN: 0
WEIGHT LOSS: 0
PALPITATIONS: 0
LEG SWELLING: 0
EYE WATERING: 0
CONSTIPATION: 0
DECREASED LIBIDO: 0
LEG PAIN: 0
HALLUCINATIONS: 0
BREAST MASS: 0
NAUSEA: 0
ARTHRALGIAS: 0
PANIC: 0
EXTREMITY NUMBNESS: 0
SNORES LOUDLY: 0
NECK PAIN: 0
COUGH: 0
ORTHOPNEA: 0
COUGH DISTURBING SLEEP: 0
EYE REDNESS: 0
CHILLS: 0
BLOATING: 0
STIFFNESS: 0
DEPRESSION: 0
RECTAL BLEEDING: 0
DISTURBANCES IN COORDINATION: 0
NAIL CHANGES: 0
LIGHT-HEADEDNESS: 0
CLAUDICATION: 0
EYE IRRITATION: 0
POLYPHAGIA: 0
BLOOD IN STOOL: 0
BOWEL INCONTINENCE: 0
HEMOPTYSIS: 0
SPUTUM PRODUCTION: 0
TROUBLE SWALLOWING: 0
MUSCLE CRAMPS: 0
LOSS OF CONSCIOUSNESS: 0
DECREASED APPETITE: 0
INCREASED ENERGY: 0
WEAKNESS: 0
EXERCISE INTOLERANCE: 0
HYPOTENSION: 0
FATIGUE: 0
NIGHT SWEATS: 0
DIARRHEA: 0
TREMORS: 0
NECK MASS: 0
JAUNDICE: 0
SHORTNESS OF BREATH: 0
DECREASED CONCENTRATION: 0
HEMATURIA: 0
TINGLING: 0
SINUS CONGESTION: 0
NUMBNESS: 0
DIFFICULTY URINATING: 0
HOARSE VOICE: 0
ABDOMINAL PAIN: 0
BREAST PAIN: 0
DOUBLE VISION: 0
TACHYCARDIA: 0
SKIN CHANGES: 0
SEIZURES: 0
RESPIRATORY PAIN: 0
MYALGIAS: 0
DYSURIA: 0
MEMORY LOSS: 0
VOMITING: 0
DIZZINESS: 0
SPEECH CHANGE: 0
EYE PAIN: 0
HYPERTENSION: 0
BRUISES/BLEEDS EASILY: 0
FLANK PAIN: 0
MUSCLE WEAKNESS: 0
HEADACHES: 0
RECTAL PAIN: 0
JOINT SWELLING: 0
SINUS PAIN: 0
SMELL DISTURBANCE: 0
ALTERED TEMPERATURE REGULATION: 0
POLYDIPSIA: 0
POSTURAL DYSPNEA: 0
HEARTBURN: 0

## 2018-10-31 NOTE — PROGRESS NOTES
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 She is here today for a surveillance visit.       History-to-date:   2013: The patient is a 47 year old  female h/o endometrial hyperplasia (complex without atypia on first biopsy, normal on second biopsy) who presented for annual exam 13. Menses were previously managed with Mirena IUD. Over last handful of months, her bleeding with menses has increased to an amount similar to that occuring at the time of the initial IUD insertion. Prior to IUD, bleeding was irregular and almost continuous. At the time of IUD removal and replacement she underwent endometrial biopsy that returned adenocarcinoma; favor endometrial endometrioid FIGO grade II (see pathology report for details).   RADIATION THERAPY AND DOSE: HDR intracavitary brachytherapy, total dose 21 Gy in 3 fractions     13: Sexually active and using lubrication prn. Some deep dyspareunia. Using dilator. No bleeding. Some urinary cramping and constipation but overall symptoms are improving. Multiple family cancers, considering genetic consult. Screening current. Notes intermittent sore throat x months. History of heartburn. Mild lymphedema improving with exercise. Lymphedema Clinic has been helpful. Brother with diagnosis of prostate cancer and patient is very involved and working full time.   3/12/2014: ASCUS negative high-risk HPV. LIZ  14: No abdominal bloating, constipation, diarrhea, pain, vaginal or rectal bleeding, cough or dyspnea. Night time awakenings and hot flashes. Pt has tried melatonin, benadryl, bed time teas. Overall wishes to avoid medications. Improved sexual response and  very mild dyspareunia much improved since last visit. Some mild lower leg lymphedema with occasional use of compression stockings. Bilateral knee pain with reduced activity which has been chronic but most disturbing to pt. Pt considering knee replacement. History of blood clot secondary to surgery with negative coag testing. Priolosec d/c and pt has rare reflux symptoms.       9/17/14: Paps no longer indicated due to negative history of cervical cancer. New lower right-sided pelvic/abdominal pain x 2-3 weeks. No constipation or urinary symptoms. Sexual response had improved then now has declined again. Some increased lymphedema recently but then improved with compression and elevation. Continues to be physically active.       9/18/14: CT IMPRESSION: Simple cyst left pelvic wall fluid collection which most  likely represents post operative seroma. Postsurgical changes of  bilateral salpingo-oophorectomy and hysterectomy.      10/2/14: Impression:  Successful CT guided aspiration of left pelvic fluid collection. 20 cc of yellow thin fluid aspirated and submitted for analysis.    Pelvis, cyst fluid:  -No evidence of malignancy  -Polymorphous lymphocytes (see Comment) Specimen Adequacy: Satisfactory for evaluation.    COMMENT:  Cytologic preparations demonstrate a polymorphous mixture of small and medium lymphocytes. In the setting of previous abdominal lymphadenectomy in a patient who presents with an abdominal cyst, the findings are consistent with a lymphocele.       6/23/15: CT ap Impression:  1. No evidence for recurrent or metastatic disease in the abdomen or pelvis.  2. Previously identified left pelvic wall lymphocele is no longer present.  3. Right lower lobe 3 mm subpleural nodule, unchanged since 8/5/2013.      Today she comes to clinic feeling well and denies any concerns for her visit. She denies any vaginal bleeding, no changes in her bowel or bladder habits, no nausea/emesis, no lower extremity edema, and  no difficulties eating or sleeping. She denies any abdominal discomfort/bloating, no fevers or chills, and no chest pain or shortness of breath. She is current with her mammogram, is due for her annual exam and colonoscopy; she is looking for a new PCP to establish care with . She is sexually active and using a lubricant which does help; she denies any dryness at other times.          Review of Systems     Constitutional:  Negative for fever, chills, weight loss, weight gain, fatigue, decreased appetite, night sweats, recent stressors, height gain, height loss, post-operative complications, incisional pain, hallucinations, increased energy, hyperactivity and confused.   HENT:  Negative for ear pain, hearing loss, tinnitus, nosebleeds, trouble swallowing, hoarse voice, mouth sores, sore throat, ear discharge, tooth pain, gum tenderness, taste disturbance, smell disturbance, hearing aid, bleeding gums, dry mouth, sinus pain, sinus congestion and neck mass.    Eyes:  Negative for double vision, pain, redness, eye pain, decreased vision, eye watering, eye bulging, eye dryness, flashing lights, spots, floaters, strabismus, tunnel vision, jaundice and eye irritation.   Respiratory:   Negative for cough, hemoptysis, sputum production, shortness of breath, wheezing, sleep disturbances due to breathing, snores loudly, respiratory pain, dyspnea on exertion, cough disturbing sleep and postural dyspnea.    Cardiovascular:  Negative for chest pain, dyspnea on exertion, palpitations, orthopnea, claudication, leg swelling, fingers/toes turn blue, hypertension, hypotension, syncope, history of heart murmur, chest pain on exertion, chest pain at rest, pacemaker, few scattered varicosities, leg pain, sleep disturbances due to breathing, tachycardia, light-headedness, exercise intolerance and edema.   Gastrointestinal:  Negative for heartburn, nausea, vomiting, abdominal pain, diarrhea, constipation, blood in stool, melena, rectal  pain, bloating, hemorrhoids, bowel incontinence, jaundice, rectal bleeding, coffee ground emesis and change in stool.   Genitourinary:  Negative for bladder incontinence, dysuria, urgency, hematuria, flank pain, vaginal discharge, difficulty urinating, genital sores, dyspareunia, decreased libido, nocturia, voiding less frequently, arousal difficulty, abnormal vaginal bleeding, excessive menstruation, menstrual changes, hot flashes, vaginal dryness and postmenopausal bleeding.   Musculoskeletal:  Negative for myalgias, back pain, joint swelling, arthralgias, stiffness, muscle cramps, neck pain, bone pain, muscle weakness and fracture.   Skin:  Negative for nail changes, itching, poor wound healing, rash, hair changes, skin changes, acne, warts, poor wound healing, scarring, flaky skin, Raynaud's phenomenon, sensitivity to sunlight and skin thickening.   Neurological:  Negative for dizziness, tingling, tremors, speech change, seizures, loss of consciousness, weakness, light-headedness, numbness, headaches, disturbances in coordination, extremity numbness, memory loss, difficulty walking and paralysis.   Endo/Heme:  Negative for anemia, swollen glands and bruises/bleeds easily.   Psychiatric/Behavioral:  Negative for depression, hallucinations, memory loss, decreased concentration, mood swings and panic attacks.    Breast:  Negative for breast discharge, breast mass, breast pain and nipple retraction.   Endocrine:  Negative for altered temperature regulation, polyphagia, polydipsia, unwanted hair growth and change in facial hair.          Past Medical History:    Past Medical History:   Diagnosis Date     Arthritis      Blood clot in vein 2010    negative coag testing.      Cough      Endometrial cancer (H)      Endometrial hyperplasia 2009     Gastro-oesophageal reflux disease      PONV (postoperative nausea and vomiting)      Shortness of breath          Past Surgical History:    Past Surgical History:   Procedure  Laterality Date     ARTHROPLASTY KNEE Left 3/24/2015    Procedure: ARTHROPLASTY KNEE;  Surgeon: Magan No MD;  Location: [facility] OR     DAVINCI HYSTERECTOMY TOTAL, BILATERAL SALPINGO-OOPHORECTOMY, NODE DISSECTION, COMBINED  8/19/2013    Procedure: COMBINED DAVINCI HYSTERECTOMY TOTAL, SALPINGO-OOPHORECTOMY, NODE DISSECTION;  Davinci Total Laparoscopic Hysterectomy, Bilateral Salpingo-Oophorectomy, Bilateral pelvic and periaortic Lymph Node Dissection, cystoscopy and washings.  Anesthesia General with Pain block ;  Surgeon: Bonny Cruz MD;  Location: [facility] OR     KNEE SURGERY  1998    left acl     REMOVE HARDWARE KNEE  3/24/2015    Procedure: REMOVE HARDWARE KNEE;  Surgeon: Magan No MD;  Location: [facility] OR     ROTATOR CUFF REPAIR RT/LT  2012    Right     WRIST SURGERY  2011    blood clot, right         Health Maintenance Due   Topic Date Due     PHQ-2 Q1 YR  10/08/1976     HIV SCREEN (SYSTEM ASSIGNED)  10/08/1982     INFLUENZA VACCINE (1) 09/01/2018     COLONOSCOPY Q5 YR  10/31/2018       Current Medications:     Current Outpatient Prescriptions   Medication Sig Dispense Refill     Multiple Vitamins-Minerals (MULTIVITAMIN PO) Take by mouth daily       VITAMIN D, CHOLECALCIFEROL, PO Take by mouth daily           Allergies:      No Known Allergies     Social History:     Social History   Substance Use Topics     Smoking status: Former Smoker     Packs/day: 0.50     Years: 5.00     Quit date: 1/1/1990     Smokeless tobacco: Never Used     Alcohol use Yes      Comment: 1-2 drinks/day       History   Drug Use No         Family History:       Family History   Problem Relation Age of Onset     Arthritis Mother      Arthritis Father      Hypertension Brother      Cerebrovascular Disease Mother      Peptic Ulcer Disease Father      Lipids Mother      Lipids Father      Diabetes Mother      Cancer Maternal Aunt      brain cancer, lung cancer     Cancer Brother       brother, prostate     Cancer Mother       "kidney     Cancer Paternal Uncle      prostate x 2         Physical Exam:     /85  Pulse 87  Temp 98.1  F (36.7  C) (Oral)  Resp 16  Ht 1.626 m (5' 4\")  Wt 71.2 kg (157 lb)  LMP 07/10/2013  SpO2 96%  BMI 26.95 kg/m2  Body mass index is 26.95 kg/(m^2).    General Appearance: healthy and alert, no distress     HEENT: no thyromegaly, no palpable nodules or masses        Cardiovascular: regular rate and rhythm, no gallops, rubs or murmurs     Respiratory: lungs clear, no rales, rhonchi or wheezes, normal diaphragmatic excursion    Musculoskeletal: extremities non tender and without edema    Skin: no lesions or rashes     Neurological: normal gait, no gross defects     Psychiatric: appropriate mood and affect                               Hematological: normal cervical, supraclavicular and inguinal lymph nodes     Gastrointestinal:       abdomen soft, non-tender, non-distended, no organomegaly or masses    Genitourinary: External genitalia and urethral meatus appears normal.  Vagina is smooth without nodularity or masses; very slight foreshortening mid to left vaginal apex.  Cervix surgically absent.  Bimanual exam reveal no masses, nodularity or fullness.  Recto-vaginal exam confirms these findings.      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 She is here today for a surveillance visit.     20 minutes were spent with this patient, over 50% of that time was spent in symptom management, treatment planning and in counseling and coordination of care.      Plan:     1.)        She is now 5 years post treatment and can extend her surveillance to annually; this can be done with her local provider or here. She will continue to have a pelvic/rectal exam. Reviewed recommendations from [facility] not to perform surveillance pap " smears in women diagnosed with endometrial cancer as this does not improve detection of local recurrence. Reviewed signs and symptoms for when she should contact the clinic or seek additional care. Patient to contact the clinic with any questions or concerns. She was given a handout on surveillance to give to her local provider.     2.) Genetic risk factors were assessed and the patient does not meet the qualifications for a referral.      3.) Labs and/or tests ordered include:  None.      4.) Health maintenance issues addressed today include annual health maintenance and non-gynecologic issues with PCP.    JUAN C Schumacher, WHNP-BC, ANP-BC  Women's Health Nurse Practitioner  Adult Nurse Pracitioner  Division of Gynecologic Oncology          CC  Patient Care Team:  No Ref-Primary, Physician as PCP - General  Loli Wilson MD as MD (OB/Gyn)  Teresa Johnson APRN CNP (Nurse Practitioner - Women's Health)  SELF, REFERRED

## 2018-11-01 ENCOUNTER — ONCOLOGY VISIT (OUTPATIENT)
Dept: ONCOLOGY | Facility: CLINIC | Age: 53
End: 2018-11-01
Attending: NURSE PRACTITIONER
Payer: COMMERCIAL

## 2018-11-01 VITALS
DIASTOLIC BLOOD PRESSURE: 85 MMHG | HEART RATE: 87 BPM | RESPIRATION RATE: 16 BRPM | WEIGHT: 157 LBS | OXYGEN SATURATION: 96 % | BODY MASS INDEX: 26.8 KG/M2 | SYSTOLIC BLOOD PRESSURE: 125 MMHG | TEMPERATURE: 98.1 F | HEIGHT: 64 IN

## 2018-11-01 DIAGNOSIS — C54.1 ENDOMETRIAL CANCER (H): Primary | ICD-10-CM

## 2018-11-01 PROCEDURE — 99213 OFFICE O/P EST LOW 20 MIN: CPT | Mod: ZP | Performed by: NURSE PRACTITIONER

## 2018-11-01 PROCEDURE — G0463 HOSPITAL OUTPT CLINIC VISIT: HCPCS | Mod: ZF

## 2018-11-01 ASSESSMENT — PAIN SCALES - GENERAL: PAINLEVEL: NO PAIN (0)

## 2018-11-01 NOTE — LETTER
2018       RE: Melly Huston  [address] [facility] [address]     Dear Colleague,    Thank you for referring your patient, Melly Huston, to the [facility]. Please see a copy of my visit note below.                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 She is here today for a surveillance visit.       History-to-date:   2013: The patient is a 47 year old  female h/o endometrial hyperplasia (complex without atypia on first biopsy, normal on second biopsy) who presented for annual exam 13. Menses were previously managed with Mirena IUD. Over last handful of months, her bleeding with menses has increased to an amount similar to that occuring at the time of the initial IUD insertion. Prior to IUD, bleeding was irregular and almost continuous. At the time of IUD removal and replacement she underwent endometrial biopsy that returned adenocarcinoma; favor endometrial endometrioid FIGO grade II (see pathology report for details).   RADIATION THERAPY AND DOSE: HDR intracavitary brachytherapy, total dose 21 Gy in 3 fractions     13: Sexually active and using lubrication prn. Some deep dyspareunia. Using dilator. No bleeding. Some urinary cramping and constipation but overall symptoms are improving. Multiple family cancers, considering genetic consult. Screening current. Notes intermittent sore throat x months. History of heartburn. Mild lymphedema improving with exercise. Lymphedema Clinic has been helpful. Brother with diagnosis of prostate cancer and patient is very involved and working full time.   3/12/2014: ASCUS negative high-risk HPV. LIZ  14: No  abdominal bloating, constipation, diarrhea, pain, vaginal or rectal bleeding, cough or dyspnea. Night time awakenings and hot flashes. Pt has tried melatonin, benadryl, bed time teas. Overall wishes to avoid medications. Improved sexual response and very mild dyspareunia much improved since last visit. Some mild lower leg lymphedema with occasional use of compression stockings. Bilateral knee pain with reduced activity which has been chronic but most disturbing to pt. Pt considering knee replacement. History of blood clot secondary to surgery with negative coag testing. Priolosec d/c and pt has rare reflux symptoms.       9/17/14: Paps no longer indicated due to negative history of cervical cancer. New lower right-sided pelvic/abdominal pain x 2-3 weeks. No constipation or urinary symptoms. Sexual response had improved then now has declined again. Some increased lymphedema recently but then improved with compression and elevation. Continues to be physically active.       9/18/14: CT IMPRESSION: Simple cyst left pelvic wall fluid collection which most  likely represents post operative seroma. Postsurgical changes of  bilateral salpingo-oophorectomy and hysterectomy.      10/2/14: Impression:  Successful CT guided aspiration of left pelvic fluid collection. 20 cc of yellow thin fluid aspirated and submitted for analysis.    Pelvis, cyst fluid:  -No evidence of malignancy  -Polymorphous lymphocytes (see Comment) Specimen Adequacy: Satisfactory for evaluation.    COMMENT:  Cytologic preparations demonstrate a polymorphous mixture of small and medium lymphocytes. In the setting of previous abdominal lymphadenectomy in a patient who presents with an abdominal cyst, the findings are consistent with a lymphocele.       6/23/15: CT ap Impression:  1. No evidence for recurrent or metastatic disease in the abdomen or pelvis.  2. Previously identified left pelvic wall lymphocele is no longer present.  3. Right lower lobe 3 mm  subpleural nodule, unchanged since 8/5/2013.      Today she comes to clinic feeling well and denies any concerns for her visit. She denies any vaginal bleeding, no changes in her bowel or bladder habits, no nausea/emesis, no lower extremity edema, and no difficulties eating or sleeping. She denies any abdominal discomfort/bloating, no fevers or chills, and no chest pain or shortness of breath. She is current with her mammogram, is due for her annual exam and colonoscopy; she is looking for a new PCP to establish care with . She is sexually active and using a lubricant which does help; she denies any dryness at other times.          Review of Systems     Constitutional:  Negative for fever, chills, weight loss, weight gain, fatigue, decreased appetite, night sweats, recent stressors, height gain, height loss, post-operative complications, incisional pain, hallucinations, increased energy, hyperactivity and confused.   HENT:  Negative for ear pain, hearing loss, tinnitus, nosebleeds, trouble swallowing, hoarse voice, mouth sores, sore throat, ear discharge, tooth pain, gum tenderness, taste disturbance, smell disturbance, hearing aid, bleeding gums, dry mouth, sinus pain, sinus congestion and neck mass.    Eyes:  Negative for double vision, pain, redness, eye pain, decreased vision, eye watering, eye bulging, eye dryness, flashing lights, spots, floaters, strabismus, tunnel vision, jaundice and eye irritation.   Respiratory:   Negative for cough, hemoptysis, sputum production, shortness of breath, wheezing, sleep disturbances due to breathing, snores loudly, respiratory pain, dyspnea on exertion, cough disturbing sleep and postural dyspnea.    Cardiovascular:  Negative for chest pain, dyspnea on exertion, palpitations, orthopnea, claudication, leg swelling, fingers/toes turn blue, hypertension, hypotension, syncope, history of heart murmur, chest pain on exertion, chest pain at rest, pacemaker, few scattered  varicosities, leg pain, sleep disturbances due to breathing, tachycardia, light-headedness, exercise intolerance and edema.   Gastrointestinal:  Negative for heartburn, nausea, vomiting, abdominal pain, diarrhea, constipation, blood in stool, melena, rectal pain, bloating, hemorrhoids, bowel incontinence, jaundice, rectal bleeding, coffee ground emesis and change in stool.   Genitourinary:  Negative for bladder incontinence, dysuria, urgency, hematuria, flank pain, vaginal discharge, difficulty urinating, genital sores, dyspareunia, decreased libido, nocturia, voiding less frequently, arousal difficulty, abnormal vaginal bleeding, excessive menstruation, menstrual changes, hot flashes, vaginal dryness and postmenopausal bleeding.   Musculoskeletal:  Negative for myalgias, back pain, joint swelling, arthralgias, stiffness, muscle cramps, neck pain, bone pain, muscle weakness and fracture.   Skin:  Negative for nail changes, itching, poor wound healing, rash, hair changes, skin changes, acne, warts, poor wound healing, scarring, flaky skin, Raynaud's phenomenon, sensitivity to sunlight and skin thickening.   Neurological:  Negative for dizziness, tingling, tremors, speech change, seizures, loss of consciousness, weakness, light-headedness, numbness, headaches, disturbances in coordination, extremity numbness, memory loss, difficulty walking and paralysis.   Endo/Heme:  Negative for anemia, swollen glands and bruises/bleeds easily.   Psychiatric/Behavioral:  Negative for depression, hallucinations, memory loss, decreased concentration, mood swings and panic attacks.    Breast:  Negative for breast discharge, breast mass, breast pain and nipple retraction.   Endocrine:  Negative for altered temperature regulation, polyphagia, polydipsia, unwanted hair growth and change in facial hair.          Past Medical History:    Past Medical History:   Diagnosis Date     Arthritis      Blood clot in vein 2010    negative coag  testing.      Cough      Endometrial cancer (H)      Endometrial hyperplasia 2009     Gastro-oesophageal reflux disease      PONV (postoperative nausea and vomiting)      Shortness of breath          Past Surgical History:    Past Surgical History:   Procedure Laterality Date     ARTHROPLASTY KNEE Left 3/24/2015    Procedure: ARTHROPLASTY KNEE;  Surgeon: Magan No MD;  Location: [facility] OR     DAVINCI HYSTERECTOMY TOTAL, BILATERAL SALPINGO-OOPHORECTOMY, NODE DISSECTION, COMBINED  8/19/2013    Procedure: COMBINED DAVINCI HYSTERECTOMY TOTAL, SALPINGO-OOPHORECTOMY, NODE DISSECTION;  Davinci Total Laparoscopic Hysterectomy, Bilateral Salpingo-Oophorectomy, Bilateral pelvic and periaortic Lymph Node Dissection, cystoscopy and washings.  Anesthesia General with Pain block ;  Surgeon: Bonny Curz MD;  Location: [facility] OR     KNEE SURGERY  1998    left acl     REMOVE HARDWARE KNEE  3/24/2015    Procedure: REMOVE HARDWARE KNEE;  Surgeon: Magan No MD;  Location:  OR     ROTATOR CUFF REPAIR RT/LT  2012    Right     WRIST SURGERY  2011    blood clot, right         Health Maintenance Due   Topic Date Due     PHQ-2 Q1 YR  10/08/1976     HIV SCREEN (SYSTEM ASSIGNED)  10/08/1982     INFLUENZA VACCINE (1) 09/01/2018     COLONOSCOPY Q5 YR  10/31/2018       Current Medications:     Current Outpatient Prescriptions   Medication Sig Dispense Refill     Multiple Vitamins-Minerals (MULTIVITAMIN PO) Take by mouth daily       VITAMIN D, CHOLECALCIFEROL, PO Take by mouth daily           Allergies:      No Known Allergies     Social History:     Social History   Substance Use Topics     Smoking status: Former Smoker     Packs/day: 0.50     Years: 5.00     Quit date: 1/1/1990     Smokeless tobacco: Never Used     Alcohol use Yes      Comment: 1-2 drinks/day       History   Drug Use No         Family History:       Family History   Problem Relation Age of Onset     Arthritis Mother      Arthritis Father      Hypertension  "Brother      Cerebrovascular Disease Mother      Peptic Ulcer Disease Father      Lipids Mother      Lipids Father      Diabetes Mother      Cancer Maternal Aunt      brain cancer, lung cancer     Cancer Brother       brother, prostate     Cancer Mother      kidney     Cancer Paternal Uncle      prostate x 2         Physical Exam:     /85  Pulse 87  Temp 98.1  F (36.7  C) (Oral)  Resp 16  Ht 1.626 m (5' 4\")  Wt 71.2 kg (157 lb)  LMP 07/10/2013  SpO2 96%  BMI 26.95 kg/m2  Body mass index is 26.95 kg/(m^2).    General Appearance: healthy and alert, no distress     HEENT: no thyromegaly, no palpable nodules or masses        Cardiovascular: regular rate and rhythm, no gallops, rubs or murmurs     Respiratory: lungs clear, no rales, rhonchi or wheezes, normal diaphragmatic excursion    Musculoskeletal: extremities non tender and without edema    Skin: no lesions or rashes     Neurological: normal gait, no gross defects     Psychiatric: appropriate mood and affect                               Hematological: normal cervical, supraclavicular and inguinal lymph nodes     Gastrointestinal:       abdomen soft, non-tender, non-distended, no organomegaly or masses    Genitourinary: External genitalia and urethral meatus appears normal.  Vagina is smooth without nodularity or masses; very slight foreshortening mid to left vaginal apex.  Cervix surgically absent.  Bimanual exam reveal no masses, nodularity or fullness.  Recto-vaginal exam confirms these findings.      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 She is here today for a surveillance visit.     20 minutes were spent with this patient, over 50% of that time was spent in symptom management, treatment planning and in counseling and coordination of care.      Plan: "     1.)        She is now 5 years post treatment and can extend her surveillance to annually; this can be done with her local provider or here. She will continue to have a pelvic/rectal exam. Reviewed recommendations from [facility] not to perform surveillance pap smears in women diagnosed with endometrial cancer as this does not improve detection of local recurrence. Reviewed signs and symptoms for when she should contact the clinic or seek additional care. Patient to contact the clinic with any questions or concerns. She was given a handout on surveillance to give to her local provider.     2.) Genetic risk factors were assessed and the patient does not meet the qualifications for a referral.      3.) Labs and/or tests ordered include:  None.      4.) Health maintenance issues addressed today include annual health maintenance and non-gynecologic issues with PCP.    JUAN C Schumacher, WHNP-BC, ANP-BC  Women's Health Nurse Practitioner  Adult Nurse Pracitioner  Division of Gynecologic Oncology        CC  Patient Care Team:  No Ref-Primary, Physician as PCP - Loli Goodwin MD as MD (OB/Gyn)

## 2018-11-01 NOTE — NURSING NOTE
"Oncology Rooming Note    November 1, 2018 7:29 AM   Melly Huston is a 53 year old female who presents for:    Chief Complaint   Patient presents with     Oncology Clinic Visit     Return Endometrial Ca     Initial Vitals: /85  Pulse 87  Temp 98.1  F (36.7  C) (Oral)  Resp 16  Ht 1.626 m (5' 4\")  Wt 71.2 kg (157 lb)  LMP 07/10/2013  SpO2 96%  BMI 26.95 kg/m2 Estimated body mass index is 26.95 kg/(m^2) as calculated from the following:    Height as of this encounter: 1.626 m (5' 4\").    Weight as of this encounter: 71.2 kg (157 lb). Body surface area is 1.79 meters squared.  No Pain (0) Comment: Data Unavailable   Patient's last menstrual period was 07/10/2013.  Allergies reviewed: Yes  Medications reviewed: Yes    Medications: Medication refills not needed today.  Pharmacy name entered into [employer]: [place], [place] - [address] [place] [address] [phone]    Clinical concerns: 6 month check; no new concerns    6 minutes for nursing intake (face to face time)     Lindy Perrin CMA              "

## 2018-11-01 NOTE — MR AVS SNAPSHOT
After Visit Summary   11/1/2018    Melly Huston    MRN: [id number]           Patient Information     Date Of Birth          1965        Visit Information        Provider Department      11/1/2018 7:30 AM Teresa Johnson APRN CNP [facility]        Today's Diagnoses     Endometrial cancer (H)    -  1      Care Instructions    Due for pelvic/rectal 11/2019          Follow-ups after your visit        Follow-up notes from your care team     Return if symptoms worsen or fail to improve.      Who to contact     If you have questions or need follow up information about today's clinic visit or your schedule please contact [facility] directly at [phone].  Normal or non-critical lab and imaging results will be communicated to you by Appknoxhart, letter or phone within 4 business days after the clinic has received the results. If you do not hear from us within 7 days, please contact the clinic through Appkn[employer] or phone. If you have a critical or abnormal lab result, we will notify you by phone as soon as possible.  Submit refill requests through [email or web address] or call your pharmacy and they will forward the refill request to us. Please allow 3 business days for your refill to be completed.          Additional Information About Your Visit        MyChart Information     [email or web address] gives you secure access to your electronic health record. If you see a primary care provider, you can also send messages to your care team and make appointments. If you have questions, please call your primary care clinic.  If you do not have a primary care provider, please call [phone] and they will assist you.        Care EveryWhere ID     This is your Care EveryWhere ID. This could be used by other organizations to access your [facility] medical records  [id number]        Your Vitals Were     Pulse Temperature Respirations Height Last Period Pulse Oximetry    87 98.1  F (36.7  C)  "(Oral) 16 1.626 m (5' 4\") 07/10/2013 96%    BMI (Body Mass Index)                   26.95 kg/m2            Blood Pressure from Last 3 Encounters:   11/01/18 125/85   08/17/18 131/79   04/26/18 123/85    Weight from Last 3 Encounters:   11/01/18 71.2 kg (157 lb)   04/26/18 72.2 kg (159 lb 2.8 oz)   10/26/17 73.5 kg (162 lb)              Today, you had the following     No orders found for display       Primary Care Provider Fax #    Physician No Ref-Primary [phone]       No address on file        Equal Access to Services     [facility]ON : Indy Sanders, elijah oliva, jermaine harris, pierre bolanos . So [facility] [phone].    ATENCIÓN: Si habla español, tiene a chun disposición servicios gratuitos de asistencia lingüística. Llame al [phone].    We comply with applicable federal civil rights laws and [place] laws. We do not discriminate on the basis of race, color, national origin, age, disability, sex, sexual orientation, or gender identity.            Thank you!     Thank you for choosing [facility]  for your care. Our goal is always to provide you with excellent care. Hearing back from our patients is one way we can continue to improve our services. Please take a few minutes to complete the written survey that you may receive in the mail after your visit with us. Thank you!             Your Updated Medication List - Protect others around you: Learn how to safely use, store and throw away your medicines at www.disposemymeds.org.          This list is accurate as of 11/1/18  7:47 AM.  Always use your most recent med list.                   Brand Name Dispense Instructions for use Diagnosis    MULTIVITAMIN PO      Take by mouth daily        VITAMIN D (CHOLECALCIFEROL) PO      Take by mouth daily          "

## 2019-02-06 ENCOUNTER — DOCUMENTATION ONLY (OUTPATIENT)
Dept: CARE COORDINATION | Facility: CLINIC | Age: 54
End: 2019-02-06

## 2019-02-14 ASSESSMENT — ENCOUNTER SYMPTOMS
FATIGUE: 0
DECREASED APPETITE: 0
WEIGHT GAIN: 0
WEIGHT LOSS: 0
INCREASED ENERGY: 0
HALLUCINATIONS: 0
POLYDIPSIA: 0
FEVER: 0
POLYPHAGIA: 0
NIGHT SWEATS: 1
CHILLS: 0
ALTERED TEMPERATURE REGULATION: 0

## 2019-02-15 ENCOUNTER — HEALTH MAINTENANCE LETTER (OUTPATIENT)
Age: 54
End: 2019-02-15

## 2019-02-27 ENCOUNTER — TELEPHONE (OUTPATIENT)
Dept: GASTROENTEROLOGY | Facility: CLINIC | Age: 54
End: 2019-02-27

## 2019-02-27 ENCOUNTER — OFFICE VISIT (OUTPATIENT)
Dept: FAMILY MEDICINE | Facility: CLINIC | Age: 54
End: 2019-02-27
Payer: COMMERCIAL

## 2019-02-27 VITALS
WEIGHT: 159 LBS | DIASTOLIC BLOOD PRESSURE: 72 MMHG | SYSTOLIC BLOOD PRESSURE: 105 MMHG | OXYGEN SATURATION: 97 % | HEIGHT: 64 IN | HEART RATE: 61 BPM | BODY MASS INDEX: 27.14 KG/M2

## 2019-02-27 DIAGNOSIS — Z00.00 ANNUAL PHYSICAL EXAM: Primary | ICD-10-CM

## 2019-02-27 DIAGNOSIS — C54.1 ENDOMETRIAL CANCER (H): ICD-10-CM

## 2019-02-27 DIAGNOSIS — E78.00 HYPERCHOLESTEROLEMIA: ICD-10-CM

## 2019-02-27 SDOH — HEALTH STABILITY: MENTAL HEALTH
DO YOU FEEL STRESS - TENSE, RESTLESS, NERVOUS, OR ANXIOUS, OR UNABLE TO SLEEP AT NIGHT BECAUSE YOUR MIND IS TROUBLED ALL THE TIME - THESE DAYS?: TO SOME EXTENT

## 2019-02-27 SDOH — HEALTH STABILITY: PHYSICAL HEALTH: ON AVERAGE, HOW MANY MINUTES DO YOU ENGAGE IN EXERCISE AT THIS LEVEL?: 40 MIN

## 2019-02-27 SDOH — HEALTH STABILITY: PHYSICAL HEALTH: ON AVERAGE, HOW MANY DAYS PER WEEK DO YOU ENGAGE IN MODERATE TO STRENUOUS EXERCISE (LIKE A BRISK WALK)?: 5 DAYS

## 2019-02-27 SDOH — HEALTH STABILITY: MENTAL HEALTH
STRESS IS WHEN SOMEONE FEELS TENSE, NERVOUS, ANXIOUS, OR CAN'T SLEEP AT NIGHT BECAUSE THEIR MIND IS TROUBLED. HOW STRESSED ARE YOU?: TO SOME EXTENT

## 2019-02-27 ASSESSMENT — PAIN SCALES - GENERAL: PAINLEVEL: NO PAIN (0)

## 2019-02-27 ASSESSMENT — MIFFLIN-ST. JEOR: SCORE: 1302.73

## 2019-02-27 NOTE — PATIENT INSTRUCTIONS
Primary Care Center Phone Number: [phone]   Primary Care Center Medication Refill Request Information:  * Please contact your pharmacy regarding ANY request for medication refills.  ** [facility] Prescription Fax = [phone]  * Please allow 3 business days for routine medication refills.  * Please allow 5 business days for controlled substance medication refills.     Primary Care Center Test Result notification information:  *You will be notified with in 7-10 days of your appointment day regarding the results of your test.  If you are on MyChart you will be notified as soon as the provider has reviewed the results and signed off on them.    [email or web address]    2 shot series - get at [facility] or [facility]   Patient should take 1200mg of calcium/day in divided doses diet and supplement  and vitamin D3 1000IU/day.  Only absorb 5-600mg of calcium at a time  Diet is the best source of calcium    Schedule mammogram   Schedule colonoscopy  Repeat lipid when fasting     Nutritious diet  Eat 1200 mg calcium total every day.  Many people achieve this by a diet containing calcium (milk, yogurt, cheese, and other dairy products, supplemented food) and 1 calcium pill. If you don't eat dairy, you should take a calcium supplement 2 times a day.  Eat 1000 IU of vitamin D on daily basis.    Eat a variety of fruits and vegetables and eat whole grains.  Try to choose foods with a high NuVal score, if your grocery store shows this number. High numbers, like 80 or 90, are nutritious foods, and low scores, like 10 are less nutritious foods.          Men should drink no more than 2 alcoholic beverages daily on average; women should drink no more than 1 alcoholic beverage daily on average.    Everyone should avoid all tobacco and nicotine-containing products.    Exercise: Aim for:  Moderate activity (where you can still talk while doing it, such as walking about 100 steps per minute, or 3,000 steps in 30 minutes) for 30 minutes at least 5 days a  week  Or  Vigorous exercise (you are working so hard you are breathing hard and fast and your heart rate has gone up, such as playing basketball or soccer) at least 75 minutes weekly    If you have trouble doing 30 minutes of activity, all at once, try small bursts of activity. Go to www.abefitness.com for suggestions on how you can do this at home or work.     All adults should try to do muscle strengthening exercise at least 2 days a week    Safety  Always wear bike/motorcycle helmet and seatbelt in a vehicle  Make sure your home has smoke and carbon monoxide detectors.  Wear broad spectrum sunscreen of at least SPF 15 on sun-exposed skin.    Preventing disease  See your dentist at least once a year  Have your eyes checked every 1-2 years.  Get a flu shot each year.

## 2019-02-27 NOTE — PROGRESS NOTES
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     Endometrial cancer:  When the patient was 47 years old she presented for annual exam on 13. Menses were previously managed with Mirena IUD. Over last handful of months, her bleeding with menses had increased to an amount similar to that occurring at the time of the initial IUD insertion. Prior to IUD, bleeding was irregular and almost continuous. At the time of IUD removal and replacement she underwent endometrial biopsy that returned adenocarcinoma; favor endometrial endometrioid FIGO grade II (see pathology report for details). She underwent a hysterectomy, bilateral salpingo-oophorectomy and node dissection as well as radiation. She has been cancer free since and has been doing annual cervical surveillance exams since with Dr. Johnson in Oncology. She is 5 yrs cancer free. She gets yearly pelvics/rectal exams  and last one was 2018. Her surveillance can be yearly here or oncology. Pap smears are not recommended.     Left knee replacement:  The patient plans to travel abroad to [place] in 3 weeks. She wonders if her artificial left knee is going to set off the metal detectors and requests a note stating she has one.     Healthcare maintenance items discussed:  Colonoscopy: Ordered today.   Mammogram: 2018 normal. - order placed    Pap: No longer indicated (hysterectomy).   Lipids: RTC when fasting.  Eye exam: Reports she has had them checked periodically, but not regularly.    Shingles: Given information today. Recommended to get at a pharmacy.   Tdap: Up to date.     Review of Systems:   Pertinent items are noted in HPI or as in patient entered ROS below, remainder of complete ROS is negative.  Answers for HPI/ROS submitted by the patient on 2/14/2019   General Symptoms: Yes  Skin Symptoms: No  HENT Symptoms: No  EYE SYMPTOMS: No  HEART SYMPTOMS: No  LUNG SYMPTOMS: No  INTESTINAL SYMPTOMS: No  URINARY SYMPTOMS: No  GYNECOLOGIC SYMPTOMS: No  BREAST SYMPTOMS: No  SKELETAL SYMPTOMS: No  BLOOD SYMPTOMS: No  NERVOUS SYSTEM SYMPTOMS: No  MENTAL HEALTH SYMPTOMS: No  Fever: No  Loss of appetite: No  Weight loss: No  Weight gain: No  Fatigue: No  Night sweats: Yes  Chills: No  Increased stress: No  Excessive hunger: No  Excessive thirst: No  Feeling hot or cold when others believe the temperature is normal: No  Loss of height: No  Post-operative complications: No  Surgical site pain: No  Hallucinations: No  Change in or Loss of Energy: No  Hyperactivity: No  Confusion: No  Sleep problems: Yes    Active Medications:      Multiple Vitamins-Minerals (MULTIVITAMIN PO), Take by mouth daily, Disp: , Rfl:      VITAMIN D, CHOLECALCIFEROL, PO, Take by mouth daily, Disp: , Rfl:       Allergies: Patient has no known allergies.      Past Medical History:  Blood clot in vein, removed  PONV (postoperative nausea and vomiting)  Endometrial cancer  OA (osteoarthritis) of knee  Shoulder pain, right    Past Surgical History:  Left knee arthroplasty  Total hysterectomy, bilateral salpingo-oophorectomy, node dissection, combined  Left knee ACL repair  Knee hardware removal  Right rotator cuff repair  Right wrist blood clot removal    Family History:   Arthritis - father, mother  Hypertension - brother  Cerebrovascular disease - mother  Peptic ulcer disease - father  Hyperlipidemia - mother, father  Diabetes - mother, brother  Brain cancer - maternal aunt  Lung cancer - maternal aunt  Prostate cancer - brother,  "paternal uncle (x2), father   Kidney cancer - mother    Social History:   Presents to clinic alone.   Tobacco Use: Former smoker, quit in   Alcohol Use: 1-2 drinks daily.   Drug Use: No drug use.   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 She is  and has 1 child. She walks for 40 minutes 5x weekly and swims. She has some stress from work, but states it is good stress.     Physical Exam:   /72   Pulse 61   Ht 1.62 m (5' 3.78\")   Wt 72.1 kg (159 lb)   LMP 07/10/2013   SpO2 97%   BMI 27.48 kg/m     Gen:  53 year old female in NAD  HEENT: PERRL Ears clear with good light reflex. OP MMM no lesions. No drainage  Neck  Supple. Thyroid normal. No carotid bruits. No LAD  CVS exam: S1, S2 normal, no murmur, click, rub or gallop, regular rate and rhythm, chest is clear without rales or wheezing, no pedal edema, no JVD, no hepatosplenomegaly.  Respiratory: Normal - Clear to auscultation without rales, rhonchi, or wheezing.  BREAST:  normal without suspicious masses, skin changes or axillary nodes, symmetric fibrous changes in both upper outer quadrants  Pelvic: Deferred.   Rectal: Deferred.   Abd: Soft, ND, NT, BS active. No masses or HSM  Ext: Good pulses. No edema  Musculoskeletal: spine is straight, extremities full ROM, no deformity  Neuro: Neurological exam reveals normal without focal findings, mental status, speech normal, alert and oriented x iii, cranial nerves 1-12 intact.       Assessment and Plan:  Melly Huston is a 53 year old  female with a history of endometrial cancer stage 1A grade 3  s/p surgery and radiation treatment completed in  who presents for establishment of primary care and annual physical exam. She has now 5 years post treatment and can extend her surveillance to annually.     Annual physical exam  - Mammogram, routine screening, discussed shingrex - reviewed other immunizations, discussed calcium " and vit D     Endometrial cancer stage 1A gr 3  - Reviewed oncology course. She is due for her next pelvic/rectal  exam in October. Pap smears are not recommended  - could stretch this to yearly exams.     Hypercholesterolemia - RTC for fasting lipid panel. Ordered colonoscopy today.   - GASTROENTEROLOGY ADULT REF PROCEDURE ONLY      wrote a letter regarding her left knee replacement - for airport security.   Scribe Disclosure:   I, Catherine Watts, am serving as a scribe to document services personally performed by Judie Montaño MD PhD at this visit, based upon the provider's statements to me. All documentation has been reviewed by the aforementioned provider prior to being entered into the official medical record.     Portions of this medical record were completed by a scribe. UPON MY REVIEW AND AUTHENTICATION BY ELECTRONIC SIGNATURE, this confirms (a) I performed the applicable clinical services, and (b) the record is accurate.

## 2019-02-27 NOTE — LETTER
Melly Huston  [address] [facility] [place] [address]  : 1965  MRN:  [id number]      2019          To whom it may concern.      Melly Huston is a patient of mine and she has an artifical left knee which might alert security  clearance.     Sincerely  Judie Montaño MD, PhD

## 2019-02-27 NOTE — NURSING NOTE
Chief Complaint   Patient presents with     Establish Care     Pt is here to establish care with a new PCP     Physical     Pt is also here for a physical        Lola Cabrera EMT at 3:38 PM on 2/27/2019

## 2019-04-11 LAB
CHOLEST SERPL-MCNC: 268 MG/DL
HDLC SERPL-MCNC: 68 MG/DL
LDLC SERPL CALC-MCNC: 170 MG/DL
NONHDLC SERPL-MCNC: 200 MG/DL
TRIGL SERPL-MCNC: 150 MG/DL

## 2019-10-03 ENCOUNTER — HEALTH MAINTENANCE LETTER (OUTPATIENT)
Age: 54
End: 2019-10-03

## 2019-10-11 DIAGNOSIS — Z00.00 ANNUAL PHYSICAL EXAM: ICD-10-CM

## 2019-10-31 ENCOUNTER — HEALTH MAINTENANCE LETTER (OUTPATIENT)
Age: 54
End: 2019-10-31

## 2020-11-03 DIAGNOSIS — Z12.31 VISIT FOR SCREENING MAMMOGRAM: ICD-10-CM

## 2020-11-03 PROCEDURE — 77067 SCR MAMMO BI INCL CAD: CPT | Performed by: RADIOLOGY

## 2021-01-15 ENCOUNTER — HEALTH MAINTENANCE LETTER (OUTPATIENT)
Age: 56
End: 2021-01-15

## 2021-09-05 ENCOUNTER — HEALTH MAINTENANCE LETTER (OUTPATIENT)
Age: 56
End: 2021-09-05

## 2022-02-20 ENCOUNTER — HEALTH MAINTENANCE LETTER (OUTPATIENT)
Age: 57
End: 2022-02-20

## 2022-03-09 ENCOUNTER — ANCILLARY PROCEDURE (OUTPATIENT)
Dept: MAMMOGRAPHY | Facility: CLINIC | Age: 57
End: 2022-03-09
Payer: COMMERCIAL

## 2022-03-09 DIAGNOSIS — Z12.31 VISIT FOR SCREENING MAMMOGRAM: ICD-10-CM

## 2022-03-09 PROCEDURE — 77067 SCR MAMMO BI INCL CAD: CPT | Mod: GC | Performed by: STUDENT IN AN ORGANIZED HEALTH CARE EDUCATION/TRAINING PROGRAM

## 2022-10-23 ENCOUNTER — HEALTH MAINTENANCE LETTER (OUTPATIENT)
Age: 57
End: 2022-10-23

## 2023-04-02 ENCOUNTER — HEALTH MAINTENANCE LETTER (OUTPATIENT)
Age: 58
End: 2023-04-02

## 2023-04-28 ASSESSMENT — ENCOUNTER SYMPTOMS
WHEEZING: 1
BOWEL INCONTINENCE: 1
DECREASED APPETITE: 0
JAUNDICE: 0
DIARRHEA: 1
COUGH DISTURBING SLEEP: 0
POLYPHAGIA: 0
NAUSEA: 0
COUGH: 0
HEMOPTYSIS: 0
HEARTBURN: 1
POSTURAL DYSPNEA: 0
DECREASED LIBIDO: 0
POLYDIPSIA: 0
WEIGHT GAIN: 0
VOMITING: 0
NIGHT SWEATS: 1
ABDOMINAL PAIN: 1
ALTERED TEMPERATURE REGULATION: 0
FATIGUE: 0
BLOOD IN STOOL: 0
CONSTIPATION: 0
WEIGHT LOSS: 0
FEVER: 0
HOT FLASHES: 1
SPUTUM PRODUCTION: 1
RECTAL PAIN: 0
SHORTNESS OF BREATH: 0
DYSPNEA ON EXERTION: 1
INCREASED ENERGY: 0
CHILLS: 0
HALLUCINATIONS: 0
SNORES LOUDLY: 1
BLOATING: 0

## 2023-05-09 ENCOUNTER — ANCILLARY PROCEDURE (OUTPATIENT)
Dept: MAMMOGRAPHY | Facility: CLINIC | Age: 58
End: 2023-05-09
Payer: COMMERCIAL

## 2023-05-09 DIAGNOSIS — Z12.31 VISIT FOR SCREENING MAMMOGRAM: ICD-10-CM

## 2023-05-09 PROCEDURE — 77067 SCR MAMMO BI INCL CAD: CPT | Mod: GC | Performed by: RADIOLOGY

## 2023-05-09 NOTE — PROGRESS NOTES
HPI:    Ms. Huston comes in for a physical today. She has h/o endometrial cancer. She has R chest wall mass and pain. She has chronic pelvic pain. She has some reflux, and throat clearing as well. She denies any urinary issues. She has excellent exercise tolerance and rides her bike to work. No skin concerns. She states she had some adverse issues with vaccines and wants to defer further vaccinations. No weight loss. No other HEENT, caridopulmonary, abdominal, , GYN, neurological, systemic, psychiatric, lymphatic, endocrine, vascular complaints.     Past Medical History:   Diagnosis Date     Arthritis      Blood clot in vein 2010    negative coag testing.      Endometrial cancer (H)      PONV (postoperative nausea and vomiting)      Past Surgical History:   Procedure Laterality Date     ARTHROPLASTY KNEE Left 3/24/2015    Procedure: ARTHROPLASTY KNEE;  Surgeon: Magan No MD;  Location:  OR     DAVINCI HYSTERECTOMY TOTAL, BILATERAL SALPINGO-OOPHORECTOMY, NODE DISSECTION, COMBINED  8/19/2013    Procedure: COMBINED DAVINCI HYSTERECTOMY TOTAL, SALPINGO-OOPHORECTOMY, NODE DISSECTION;  Davinci Total Laparoscopic Hysterectomy, Bilateral Salpingo-Oophorectomy, Bilateral pelvic and periaortic Lymph Node Dissection, cystoscopy and washings.  Anesthesia General with Pain block ;  Surgeon: Bonny Cruz MD;  Location: [facility] OR     KNEE SURGERY  1998    left acl     REMOVE HARDWARE KNEE  3/24/2015    Procedure: REMOVE HARDWARE KNEE;  Surgeon: Magan No MD;  Location:  OR     ROTATOR CUFF REPAIR RT/LT  2012    Right     WRIST SURGERY  2011    blood clot, right     PE;    Vitals noted, gen, nad, cooperative, alert, neck supple nl rom, no B carotid bruits, she has possibly a slightly tender L back mass upper area, RRR, S1, S2, no MRG, abdomen, no acute findings. Grossly normal neurological exam.     Results for orders placed or performed in visit on 05/10/23   Lipid panel reflex to direct LDL Fasting      Status: Abnormal   Result Value Ref Range    Cholesterol 316 (H) <200 mg/dL    Triglycerides 288 (H) <150 mg/dL    Direct Measure HDL 65 >=50 mg/dL    LDL Cholesterol Calculated 193 (H) <=100 mg/dL    Non HDL Cholesterol 251 (H) <130 mg/dL    Narrative    Cholesterol  Desirable:  <200 mg/dL    Triglycerides  Normal:  Less than 150 mg/dL  Borderline High:  150-199 mg/dL  High:  200-499 mg/dL  Very High:  Greater than or equal to 500 mg/dL    Direct Measure HDL  Female:  Greater than or equal to 50 mg/dL   Male:  Greater than or equal to 40 mg/dL    LDL Cholesterol  Desirable:  <100mg/dL  Above Desirable:  100-129 mg/dL   Borderline High:  130-159 mg/dL   High:  160-189 mg/dL   Very High:  >= 190 mg/dL    Non HDL Cholesterol  Desirable:  130 mg/dL  Above Desirable:  130-159 mg/dL  Borderline High:  160-189 mg/dL  High:  190-219 mg/dL  Very High:  Greater than or equal to 220 mg/dL   Comprehensive metabolic panel     Status: Abnormal   Result Value Ref Range    Sodium 143 136 - 145 mmol/L    Potassium 4.2 3.4 - 5.3 mmol/L    Chloride 105 98 - 107 mmol/L    Carbon Dioxide (CO2) 28 22 - 29 mmol/L    Anion Gap 10 7 - 15 mmol/L    Urea Nitrogen 17.1 6.0 - 20.0 mg/dL    Creatinine 0.85 0.51 - 0.95 mg/dL    Calcium 9.7 8.6 - 10.0 mg/dL    Glucose 115 (H) 70 - 99 mg/dL    Alkaline Phosphatase 84 35 - 104 U/L    AST 16 10 - 35 U/L    ALT 20 10 - 35 U/L    Protein Total 6.9 6.4 - 8.3 g/dL    Albumin 4.4 3.5 - 5.2 g/dL    Bilirubin Total 0.2 <=1.2 mg/dL    GFR Estimate 79 >60 mL/min/1.73m2   CBC with platelets and differential     Status: Abnormal   Result Value Ref Range    WBC Count 3.6 (L) 4.0 - 11.0 10e3/uL    RBC Count 4.18 3.80 - 5.20 10e6/uL    Hemoglobin 13.7 11.7 - 15.7 g/dL    Hematocrit 40.8 35.0 - 47.0 %    MCV 98 78 - 100 fL    MCH 32.8 26.5 - 33.0 pg    MCHC 33.6 31.5 - 36.5 g/dL    RDW 11.9 10.0 - 15.0 %    Platelet Count 221 150 - 450 10e3/uL    % Neutrophils 46 %    % Lymphocytes 34 %    % Monocytes 12 %    %  Eosinophils 6 %    % Basophils 2 %    % Immature Granulocytes 0 %    NRBCs per 100 WBC 0 <1 /100    Absolute Neutrophils 1.7 1.6 - 8.3 10e3/uL    Absolute Lymphocytes 1.2 0.8 - 5.3 10e3/uL    Absolute Monocytes 0.4 0.0 - 1.3 10e3/uL    Absolute Eosinophils 0.2 0.0 - 0.7 10e3/uL    Absolute Basophils 0.1 0.0 - 0.2 10e3/uL    Absolute Immature Granulocytes 0.0 <=0.4 10e3/uL    Absolute NRBCs 0.0 10e3/uL   CBC with platelets and differential     Status: Abnormal    Narrative    The following orders were created for panel order CBC with platelets and differential.  Procedure                               Abnormality         Status                     ---------                               -----------         ------                     CBC with platelets and d...[[id number]]  Abnormal            Final result                 Please view results for these tests on the individual orders.         A/P:    1. Mammogram was done 5/9/2023  2. Immunizations; Pfizer COVID x 2. Moderna x 1. Td/Tap 3/19/2015. Check with insurance regarding Shingrix  3. Colonoscopy; 10/31/2013 and repeat in 5 years. Ordered future colonoscopy.   4. Bone density; she does not want a DEXA scan but wants Vit D checked.   5. Dermatology; she does not feel she needs to see dermatology for a skin cancer check.   6. Lipids; ordered lipids 5/9/2023   7. Chest wall pain and mass? Pelvic pain and h/o endometrial cancer. GYN referral and CT chest/adomen/pelvis Ordered UA as well   8. Ordered lipids today 5/10/2023.   9. Reflux and some cough. As above ordered CT imaging. She can try OTC antacid and may  Need EGD will discuss at next visit.

## 2023-05-10 ENCOUNTER — TELEPHONE (OUTPATIENT)
Dept: GASTROENTEROLOGY | Facility: CLINIC | Age: 58
End: 2023-05-10

## 2023-05-10 ENCOUNTER — OFFICE VISIT (OUTPATIENT)
Dept: INTERNAL MEDICINE | Facility: CLINIC | Age: 58
End: 2023-05-10
Payer: COMMERCIAL

## 2023-05-10 ENCOUNTER — LAB (OUTPATIENT)
Dept: LAB | Facility: CLINIC | Age: 58
End: 2023-05-10
Payer: COMMERCIAL

## 2023-05-10 VITALS
SYSTOLIC BLOOD PRESSURE: 119 MMHG | BODY MASS INDEX: 29.31 KG/M2 | OXYGEN SATURATION: 96 % | HEART RATE: 83 BPM | DIASTOLIC BLOOD PRESSURE: 83 MMHG | WEIGHT: 169.6 LBS

## 2023-05-10 DIAGNOSIS — R10.2 PELVIC PAIN IN FEMALE: ICD-10-CM

## 2023-05-10 DIAGNOSIS — R22.2 CHEST MASS: ICD-10-CM

## 2023-05-10 DIAGNOSIS — Z13.220 SCREENING CHOLESTEROL LEVEL: Primary | ICD-10-CM

## 2023-05-10 DIAGNOSIS — E55.9 VITAMIN D DEFICIENCY: ICD-10-CM

## 2023-05-10 DIAGNOSIS — Z13.220 SCREENING CHOLESTEROL LEVEL: ICD-10-CM

## 2023-05-10 LAB
ALBUMIN SERPL BCG-MCNC: 4.4 G/DL (ref 3.5–5.2)
ALP SERPL-CCNC: 84 U/L (ref 35–104)
ALT SERPL W P-5'-P-CCNC: 20 U/L (ref 10–35)
ANION GAP SERPL CALCULATED.3IONS-SCNC: 10 MMOL/L (ref 7–15)
AST SERPL W P-5'-P-CCNC: 16 U/L (ref 10–35)
BASOPHILS # BLD AUTO: 0.1 10E3/UL (ref 0–0.2)
BASOPHILS NFR BLD AUTO: 2 %
BILIRUB SERPL-MCNC: 0.2 MG/DL
BUN SERPL-MCNC: 17.1 MG/DL (ref 6–20)
CALCIUM SERPL-MCNC: 9.7 MG/DL (ref 8.6–10)
CHLORIDE SERPL-SCNC: 105 MMOL/L (ref 98–107)
CHOLEST SERPL-MCNC: 316 MG/DL
CREAT SERPL-MCNC: 0.85 MG/DL (ref 0.51–0.95)
DEPRECATED CALCIDIOL+CALCIFEROL SERPL-MC: 15 UG/L (ref 20–75)
DEPRECATED HCO3 PLAS-SCNC: 28 MMOL/L (ref 22–29)
EOSINOPHIL # BLD AUTO: 0.2 10E3/UL (ref 0–0.7)
EOSINOPHIL NFR BLD AUTO: 6 %
ERYTHROCYTE [DISTWIDTH] IN BLOOD BY AUTOMATED COUNT: 11.9 % (ref 10–15)
GFR SERPL CREATININE-BSD FRML MDRD: 79 ML/MIN/1.73M2
GLUCOSE SERPL-MCNC: 115 MG/DL (ref 70–99)
HCT VFR BLD AUTO: 40.8 % (ref 35–47)
HDLC SERPL-MCNC: 65 MG/DL
HGB BLD-MCNC: 13.7 G/DL (ref 11.7–15.7)
IMM GRANULOCYTES # BLD: 0 10E3/UL
IMM GRANULOCYTES NFR BLD: 0 %
LDLC SERPL CALC-MCNC: 193 MG/DL
LYMPHOCYTES # BLD AUTO: 1.2 10E3/UL (ref 0.8–5.3)
LYMPHOCYTES NFR BLD AUTO: 34 %
MCH RBC QN AUTO: 32.8 PG (ref 26.5–33)
MCHC RBC AUTO-ENTMCNC: 33.6 G/DL (ref 31.5–36.5)
MCV RBC AUTO: 98 FL (ref 78–100)
MONOCYTES # BLD AUTO: 0.4 10E3/UL (ref 0–1.3)
MONOCYTES NFR BLD AUTO: 12 %
NEUTROPHILS # BLD AUTO: 1.7 10E3/UL (ref 1.6–8.3)
NEUTROPHILS NFR BLD AUTO: 46 %
NONHDLC SERPL-MCNC: 251 MG/DL
NRBC # BLD AUTO: 0 10E3/UL
NRBC BLD AUTO-RTO: 0 /100
PLATELET # BLD AUTO: 221 10E3/UL (ref 150–450)
POTASSIUM SERPL-SCNC: 4.2 MMOL/L (ref 3.4–5.3)
PROT SERPL-MCNC: 6.9 G/DL (ref 6.4–8.3)
RBC # BLD AUTO: 4.18 10E6/UL (ref 3.8–5.2)
SODIUM SERPL-SCNC: 143 MMOL/L (ref 136–145)
TRIGL SERPL-MCNC: 288 MG/DL
WBC # BLD AUTO: 3.6 10E3/UL (ref 4–11)

## 2023-05-10 PROCEDURE — 99000 SPECIMEN HANDLING OFFICE-LAB: CPT | Performed by: PATHOLOGY

## 2023-05-10 PROCEDURE — 36415 COLL VENOUS BLD VENIPUNCTURE: CPT | Performed by: PATHOLOGY

## 2023-05-10 PROCEDURE — 99396 PREV VISIT EST AGE 40-64: CPT | Performed by: INTERNAL MEDICINE

## 2023-05-10 PROCEDURE — 82306 VITAMIN D 25 HYDROXY: CPT | Mod: 90 | Performed by: PATHOLOGY

## 2023-05-10 PROCEDURE — 85025 COMPLETE CBC W/AUTO DIFF WBC: CPT | Performed by: PATHOLOGY

## 2023-05-10 PROCEDURE — 80053 COMPREHEN METABOLIC PANEL: CPT | Performed by: PATHOLOGY

## 2023-05-10 PROCEDURE — 80061 LIPID PANEL: CPT | Performed by: PATHOLOGY

## 2023-05-10 NOTE — TELEPHONE ENCOUNTER
Screening Questions  BLUE  KIND OF PREP RED  LOCATION [review exclusion criteria] GREEN  SEDATION TYPE        y Are you active on mychart?       Julius Duong MD in [facility] Ordering/Referring Provider?        MEDICA What type of coverage do you have?      n Have you had a positive covid test in the last 14 days?     29.0 1. BMI  [BMI 40+ - review exclusion criteria& smart-phrase document]    y  2. Are you able to give consent for your medical care? [IF NO,RN REVIEW]          n  3. Are you taking any prescription pain medications on a routine schedule   (ex narcotics: oxycodone, roxicodone, oxycontin,  and percocet)? [RN Review]        n  3a. EXTENDED PREP What kind of prescription?     n 4. Do you have any chemical dependencies such as alcohol, street drugs, or methadone?        **If yes 3- 5 , please schedule with MAC sedation.**          IF YES TO ANY 6 - 10 - HOSPITAL SETTING ONLY.     n 6.   Do you need assistance transferring?     n 7.   Have you had a heart or lung transplant?    n 8.   Are you currently on dialysis?   n 9.   Do you use daily home oxygen?   n 10. Do you take nitroglycerin?   10a. n If yes, how often?     n 11. Are you currently pregnant?    11a. n If yes, how many weeks? [ Greater than 12 weeks, OR NEEDED]    n 12. Do you have Pulmonary Hypertension? *NEED PAC APPT AT [facility] w/ MAC*     n 13. [review exclusion criteria]  Do you have any implantable devices in your body (pacemaker, defib, LVAD)?    n 14. In the past 6 months, have you had any heart related issues including cardiomyopathy or heart attack?     14a. n If yes, did it require cardiac stenting if so when?     n 15. Have you had a stroke or Transient ischemic attack (TIA - aka  mini stroke ) within 6 months?      n 16. Do you have mod to severe Obstructive Sleep Apnea?  [Hospital only]    n 17. Do you have SEVERE AND UNCONTROLLED asthma? *NEED PAC APPT AT [facility] w/MAC*     18.Do you take blood thinners?  No    n  "19. Do you take the medication named Phentermine?    19a. If yes, \"Hold for 7 days before procedure.  Please consult your prescribing provider if you have questions about holding this medication.\"     n  20. Do you have chronic kidney disease?      n  21. Do you have a diagnosis of diabetes?     n  22. On a regular basis do you go 3-5 days between bowel movements?      23. Preferred LOCAL Pharmacy for Pre Prescription      [place], [place] - [address] [place] [address] [phone]        - CLOSING REMINDERS -    You will receive a call from a Nurse to review instructions and health history.  This assessment must be completed prior to your procedure.  Failure to complete the Nurse assessment may result in the procedure being cancelled.      On the day of your procedure, please designatean adult(s) who can drive you home stay with you for the next 24 hours. The medicines used in the exam will make you sleepy. You will not be able to drive.      You cannot take public transportation, ride share services, or non-medical taxi service without a responsible caregiver.  Medical transport services are allowed with the requirement that a responsible caregiver will receive you at your destination.  We require that drivers and caregivers are confirmed prior to your procedure.      - SCHEDULING DETAILS -  n & n Hospital Setting Required & If yes, what is the exclusion?     Surgeon    07/19/2023  Date of Procedure  Lower Endoscopy [Colonoscopy]  Type of Procedure Scheduled  [facility]   MIRALAX GATORADE WITHOUT MAGNEISUM CITRATE Which Colonoscopy Prep was Sent?     mac Sedation Type     n PAC / Pre-op Required                 "

## 2023-05-10 NOTE — NURSING NOTE
Melly Huston is a 57 year old female that presents in clinic today for the following:     Chief Complaint   Patient presents with     Physical     Pt here for annual physical and wants to discuss pelvic pain from last few months       The patient's allergies and medications were reviewed. The patient's vitals were obtained without incident. The patient does not have any other questions for the provider.     Jasper Mccord, EMT at 8:21 AM on 5/10/2023.  Primary Care Clinic: [phone]

## 2023-05-11 ENCOUNTER — ANCILLARY PROCEDURE (OUTPATIENT)
Dept: CT IMAGING | Facility: CLINIC | Age: 58
End: 2023-05-11
Attending: INTERNAL MEDICINE
Payer: COMMERCIAL

## 2023-05-11 DIAGNOSIS — R10.2 PELVIC PAIN IN FEMALE: ICD-10-CM

## 2023-05-11 DIAGNOSIS — E55.9 VITAMIN D DEFICIENCY: ICD-10-CM

## 2023-05-11 DIAGNOSIS — Z13.220 SCREENING CHOLESTEROL LEVEL: ICD-10-CM

## 2023-05-11 DIAGNOSIS — R22.2 CHEST MASS: ICD-10-CM

## 2023-05-11 PROCEDURE — 71260 CT THORAX DX C+: CPT | Performed by: RADIOLOGY

## 2023-05-11 PROCEDURE — 74177 CT ABD & PELVIS W/CONTRAST: CPT | Performed by: RADIOLOGY

## 2023-05-11 RX ORDER — IOPAMIDOL 755 MG/ML
94 INJECTION, SOLUTION INTRAVASCULAR ONCE
Status: COMPLETED | OUTPATIENT
Start: 2023-05-11 | End: 2023-05-11

## 2023-05-11 RX ADMIN — IOPAMIDOL 94 ML: 755 INJECTION, SOLUTION INTRAVASCULAR at 17:43

## 2023-05-11 RX ADMIN — DIATRIZOATE MEGLUMINE AND DIATRIZOATE SODIUM 15 ML: 660; 100 SOLUTION ORAL; RECTAL at 17:42

## 2023-05-21 DIAGNOSIS — R73.09 INCREASED GLUCOSE LEVEL: Primary | ICD-10-CM

## 2023-06-13 ENCOUNTER — OFFICE VISIT (OUTPATIENT)
Dept: OBGYN | Facility: CLINIC | Age: 58
End: 2023-06-13
Attending: INTERNAL MEDICINE
Payer: COMMERCIAL

## 2023-06-13 VITALS
DIASTOLIC BLOOD PRESSURE: 71 MMHG | SYSTOLIC BLOOD PRESSURE: 127 MMHG | OXYGEN SATURATION: 98 % | HEART RATE: 92 BPM | BODY MASS INDEX: 29.38 KG/M2 | WEIGHT: 170 LBS

## 2023-06-13 DIAGNOSIS — M79.18 MYALGIA OF PELVIC FLOOR: Primary | ICD-10-CM

## 2023-06-13 DIAGNOSIS — R10.2 PELVIC PAIN IN FEMALE: ICD-10-CM

## 2023-06-13 PROCEDURE — 99203 OFFICE O/P NEW LOW 30 MIN: CPT | Performed by: OBSTETRICS & GYNECOLOGY

## 2023-06-13 NOTE — PROGRESS NOTES
S; Melly Huston is a 57 year old  who is s/p davinci hyst/BSO for endometrial cancer in .  When she was initially diagnosed, she had some discomfort and fullness in her LLQ, especially when lying on her stomach.  After surgery, that improved, but over the years it has come back, but comes and goes.  She completed all her 5 year follow-up, but has had a pelvic exam in several years. She has been evaluated by her PCP for this pain a few years ago, nothing was found and then recently when noted, she had an abd/pelvic CT that was normal.  Since she hasn't had a pelvic exam in several years, she thought she should probably come and see gyn to make sure nothing is going on.  She is otherwise without complaints.  She is sexually active, does not have this pain during intercourse or after.  She denies bleeding or abnormal discharge.  No n/v/d.  She denies constipation, is scheduled for colonoscopy later this month.    Past Medical History:   Diagnosis Date     Arthritis      Blood clot in vein     negative coag testing.      Endometrial cancer (H)      PONV (postoperative nausea and vomiting)      Past Surgical History:   Procedure Laterality Date     ARTHROPLASTY KNEE Left 3/24/2015    Procedure: ARTHROPLASTY KNEE;  Surgeon: Magan No MD;  Location:  OR     DAVINCI HYSTERECTOMY TOTAL, BILATERAL SALPINGO-OOPHORECTOMY, NODE DISSECTION, COMBINED  2013    Procedure: COMBINED DAVINCI HYSTERECTOMY TOTAL, SALPINGO-OOPHORECTOMY, NODE DISSECTION;  Davinci Total Laparoscopic Hysterectomy, Bilateral Salpingo-Oophorectomy, Bilateral pelvic and periaortic Lymph Node Dissection, cystoscopy and washings.  Anesthesia General with Pain block ;  Surgeon: Bonny Cruz MD;  Location: [facility] OR     KNEE SURGERY      left acl     REMOVE HARDWARE KNEE  3/24/2015    Procedure: REMOVE HARDWARE KNEE;  Surgeon: Magan No MD;  Location:  OR     ROTATOR CUFF REPAIR RT/LT      Right      WRIST SURGERY      blood clot, right     OB History    Para Term  AB Living   1 0 0 0 0 1   SAB IAB Ectopic Multiple Live Births   0 0 0 0 1      # Outcome Date GA Lbr Geoff/2nd Weight Sex Delivery Anes PTL Lv   1  00     Vag-Spont   NGHIA      No Known Allergies      Current Outpatient Medications:      Multiple Vitamins-Minerals (MULTIVITAMIN PO), Take by mouth daily, Disp: , Rfl:      VITAMIN D, CHOLECALCIFEROL, PO, Take by mouth daily, Disp: , Rfl:     Social History     Socioeconomic History     Marital status:      Spouse name: Not on file     Number of children: Not on file     Years of education: Not on file     Highest education level: Not on file   Occupational History     Occupation:      Employer: [employer]   Tobacco Use     Smoking status: Former     Packs/day: 0.50     Years: 5.00     Pack years: 2.50     Types: Cigarettes     Quit date: 1990     Years since quittin.4     Smokeless tobacco: Never   Vaping Use     Vaping status: Not on file   Substance and Sexual Activity     Alcohol use: Yes     Comment: 1-2 drinks/day     Drug use: No     Sexual activity: Yes     Partners: Male     Birth control/protection: Post-menopausal   Other Topics Concern     Parent/sibling w/ CABG, MI or angioplasty before 65F 55M? Not Asked   Social History Narrative     - 1 child, still working      Social Determinants of Health     Financial Resource Strain: Not on file   Food Insecurity: Not on file   Transportation Needs: Not on file   Physical Activity: Sufficiently Active (2019)    Exercise Vital Sign      Days of Exercise per Week: 5 days      Minutes of Exercise per Session: 40 min   Stress: Stress Concern Present (2019)    [employer] [place] [employer] - Occupational Stress Questionnaire      Feeling of Stress : To some extent   Social Connections: Not on file   Intimate Partner Violence: Not on file   Housing Stability: Not on file      Family History   Problem Relation Age of Onset     Arthritis Mother      Cerebrovascular Disease Mother      Lipids Mother      Diabetes Mother      Kidney Cancer Mother         kidney     Arthritis Father      Peptic Ulcer Disease Father      Lipids Father      Prostate Cancer Father      Diabetes Brother      Cancer Maternal Aunt         brain cancer, lung cancer     Lung Cancer Maternal Aunt      Brain Cancer Maternal Aunt      Prostate Cancer Brother          brother, prostate     Hypertension Brother      Cancer Paternal Uncle         prostate x 2     Past medical, surgical, social and family history were reviewed and updated in [facility].    Psych: normal affect, appropriate eye contact  Resp: no increased work of breathing  CV: no peripheral edema  Abd; SNT, no palpable masses  Lymph: no enlarged inquinal nodes  Pelvic: atrophic vulva and vagina, cuff intact without masses, NT.  Mild tenderness over proximal pelvic floor musculature bilaterally, distal NT  Skin: no visible rashes or lesions.    A/P; pelvic pain, mild pelvic floor myalgia   We discussed the findings of very mild PFM.  This may or may not be contributing to her pain, but offered pelvic floor PT.  She declines since the pain is not really bothersome for her and she just wanted a pelvic exam and peace of mind.  Questions answered.  She is aware she can reach out for PT referral at any time.    DANG HERNANDEZ MD

## 2023-07-10 ENCOUNTER — TELEPHONE (OUTPATIENT)
Dept: GASTROENTEROLOGY | Facility: CLINIC | Age: 58
End: 2023-07-10

## 2023-07-10 NOTE — TELEPHONE ENCOUNTER
Pre assessment completed for upcoming procedure.   (Please see previous telephone encounter notes for complete details)    Patient  returned call.       Procedure details:    Arrival time and facility location reviewed    Pre op exam needed? N/A    Designated  policy reviewed. Instructed to have someone stay 24 hours post procedure.     COVID policy reviewed.      Medication review:    Medications reviewed. Please see supporting documentation below. Holding recommendations discussed (if applicable).       Prep for procedure:     Reviewed procedure prep instructions.       Additional information needed?  N/A      Patient  verbalized understanding and had no questions or concerns at this time.      Melly Connor RN  Endoscopy Procedure Pre Assessment RN  [phone] option 4

## 2023-07-10 NOTE — TELEPHONE ENCOUNTER
Attempted to contact patient in order to complete pre assessment questions.     No answer. Left message to return call to [phone] option 4        Procedure details:    Patient scheduled for Colonoscopy  on 7/19/23.     Arrival time: 1230. Procedure time 1330    Pre op exam needed? N/A    [facility]; [address] [place] [address], 5[address] Floor, [place] [address]    Sedation type: MAC    Indication for procedure: screening      Chart review:     Electronic implanted devices? No    Diabetic? No    Diabetic medication HOLDING recommendations: (if applicable)  Oral diabetic medications: No  Diabetic injectables: No  Insulin: No      Medication review:    Anticoagulants? No    NSAIDS? No    Other medication HOLDING recommendations:  N/A      Prep for procedure:     Bowel prep recommendation: Miralax prep without magnesium citrate    Prep instructions sent via [employer].    Melly Cancohla RN  Endoscopy Procedure Pre Assessment RN

## 2023-07-19 ENCOUNTER — ANESTHESIA (OUTPATIENT)
Dept: SURGERY | Facility: AMBULATORY SURGERY CENTER | Age: 58
End: 2023-07-19
Payer: COMMERCIAL

## 2023-07-19 ENCOUNTER — HOSPITAL ENCOUNTER (OUTPATIENT)
Facility: AMBULATORY SURGERY CENTER | Age: 58
Discharge: HOME | End: 2023-07-19
Attending: INTERNAL MEDICINE
Payer: COMMERCIAL

## 2023-07-19 ENCOUNTER — ANESTHESIA EVENT (OUTPATIENT)
Dept: SURGERY | Facility: AMBULATORY SURGERY CENTER | Age: 58
End: 2023-07-19
Payer: COMMERCIAL

## 2023-07-19 VITALS
RESPIRATION RATE: 16 BRPM | DIASTOLIC BLOOD PRESSURE: 82 MMHG | SYSTOLIC BLOOD PRESSURE: 111 MMHG | WEIGHT: 168 LBS | TEMPERATURE: 97.7 F | HEART RATE: 78 BPM | HEIGHT: 64 IN | BODY MASS INDEX: 28.68 KG/M2 | OXYGEN SATURATION: 96 %

## 2023-07-19 VITALS — HEART RATE: 78 BPM

## 2023-07-19 LAB — COLONOSCOPY: NORMAL

## 2023-07-19 PROCEDURE — 45378 DIAGNOSTIC COLONOSCOPY: CPT | Mod: 33 | Performed by: INTERNAL MEDICINE

## 2023-07-19 RX ORDER — LIDOCAINE HYDROCHLORIDE 20 MG/ML
INJECTION, SOLUTION INFILTRATION; PERINEURAL PRN
Status: DISCONTINUED | OUTPATIENT
Start: 2023-07-19 | End: 2023-07-19

## 2023-07-19 RX ORDER — PROPOFOL 10 MG/ML
INJECTION, EMULSION INTRAVENOUS PRN
Status: DISCONTINUED | OUTPATIENT
Start: 2023-07-19 | End: 2023-07-19

## 2023-07-19 RX ORDER — NALOXONE HYDROCHLORIDE 0.4 MG/ML
0.4 INJECTION, SOLUTION INTRAMUSCULAR; INTRAVENOUS; SUBCUTANEOUS
Status: DISCONTINUED | OUTPATIENT
Start: 2023-07-19 | End: 2023-07-20 | Stop reason: HOSPADM

## 2023-07-19 RX ORDER — NALOXONE HYDROCHLORIDE 0.4 MG/ML
0.2 INJECTION, SOLUTION INTRAMUSCULAR; INTRAVENOUS; SUBCUTANEOUS
Status: DISCONTINUED | OUTPATIENT
Start: 2023-07-19 | End: 2023-07-20 | Stop reason: HOSPADM

## 2023-07-19 RX ORDER — PROPOFOL 10 MG/ML
INJECTION, EMULSION INTRAVENOUS CONTINUOUS PRN
Status: DISCONTINUED | OUTPATIENT
Start: 2023-07-19 | End: 2023-07-19

## 2023-07-19 RX ORDER — SODIUM CHLORIDE, SODIUM LACTATE, POTASSIUM CHLORIDE, CALCIUM CHLORIDE 600; 310; 30; 20 MG/100ML; MG/100ML; MG/100ML; MG/100ML
INJECTION, SOLUTION INTRAVENOUS CONTINUOUS PRN
Status: DISCONTINUED | OUTPATIENT
Start: 2023-07-19 | End: 2023-07-19

## 2023-07-19 RX ORDER — PROCHLORPERAZINE MALEATE 10 MG
10 TABLET ORAL EVERY 6 HOURS PRN
Status: DISCONTINUED | OUTPATIENT
Start: 2023-07-19 | End: 2023-07-20 | Stop reason: HOSPADM

## 2023-07-19 RX ORDER — ONDANSETRON 2 MG/ML
4 INJECTION INTRAMUSCULAR; INTRAVENOUS
Status: DISCONTINUED | OUTPATIENT
Start: 2023-07-19 | End: 2023-07-19 | Stop reason: HOSPADM

## 2023-07-19 RX ORDER — LIDOCAINE 40 MG/G
CREAM TOPICAL
Status: DISCONTINUED | OUTPATIENT
Start: 2023-07-19 | End: 2023-07-19 | Stop reason: HOSPADM

## 2023-07-19 RX ORDER — ONDANSETRON 2 MG/ML
4 INJECTION INTRAMUSCULAR; INTRAVENOUS EVERY 6 HOURS PRN
Status: DISCONTINUED | OUTPATIENT
Start: 2023-07-19 | End: 2023-07-20 | Stop reason: HOSPADM

## 2023-07-19 RX ORDER — ONDANSETRON 4 MG/1
4 TABLET, ORALLY DISINTEGRATING ORAL EVERY 6 HOURS PRN
Status: DISCONTINUED | OUTPATIENT
Start: 2023-07-19 | End: 2023-07-20 | Stop reason: HOSPADM

## 2023-07-19 RX ORDER — FLUMAZENIL 0.1 MG/ML
0.2 INJECTION, SOLUTION INTRAVENOUS
Status: DISCONTINUED | OUTPATIENT
Start: 2023-07-19 | End: 2023-07-20 | Stop reason: HOSPADM

## 2023-07-19 RX ADMIN — LIDOCAINE HYDROCHLORIDE 60 MG: 20 INJECTION, SOLUTION INFILTRATION; PERINEURAL at 13:50

## 2023-07-19 RX ADMIN — PROPOFOL 200 MCG/KG/MIN: 10 INJECTION, EMULSION INTRAVENOUS at 13:50

## 2023-07-19 RX ADMIN — SODIUM CHLORIDE, SODIUM LACTATE, POTASSIUM CHLORIDE, CALCIUM CHLORIDE: 600; 310; 30; 20 INJECTION, SOLUTION INTRAVENOUS at 13:48

## 2023-07-19 RX ADMIN — PROPOFOL 60 MG: 10 INJECTION, EMULSION INTRAVENOUS at 13:50

## 2023-07-19 NOTE — ANESTHESIA PREPROCEDURE EVALUATION
Anesthesia Pre-Procedure Evaluation    Patient: Melly Huston   MRN: [id number] : 1965        Procedure : Procedure(s):  Colonoscopy          Past Medical History:   Diagnosis Date     Arthritis      Blood clot in vein     negative coag testing.      Endometrial cancer (H)      PONV (postoperative nausea and vomiting)       Past Surgical History:   Procedure Laterality Date     ARTHROPLASTY KNEE Left 3/24/2015    Procedure: ARTHROPLASTY KNEE;  Surgeon: Magan No MD;  Location:  OR     DAVINCI HYSTERECTOMY TOTAL, BILATERAL SALPINGO-OOPHORECTOMY, NODE DISSECTION, COMBINED  2013    Procedure: COMBINED DAVINCI HYSTERECTOMY TOTAL, SALPINGO-OOPHORECTOMY, NODE DISSECTION;  Davinci Total Laparoscopic Hysterectomy, Bilateral Salpingo-Oophorectomy, Bilateral pelvic and periaortic Lymph Node Dissection, cystoscopy and washings.  Anesthesia General with Pain block ;  Surgeon: Bonny Cruz MD;  Location: [facility] OR     KNEE SURGERY      left acl     REMOVE HARDWARE KNEE  3/24/2015    Procedure: REMOVE HARDWARE KNEE;  Surgeon: Magan No MD;  Location:  OR     ROTATOR CUFF REPAIR RT/LT      Right     WRIST SURGERY  2011    blood clot, right      No Known Allergies   Social History     Tobacco Use     Smoking status: Former     Packs/day: 0.50     Years: 5.00     Pack years: 2.50     Types: Cigarettes     Quit date: 1990     Years since quittin.5     Smokeless tobacco: Never   Substance Use Topics     Alcohol use: Yes     Comment: weekends      Wt Readings from Last 1 Encounters:   23 76.2 kg (168 lb)        Anesthesia Evaluation   Pt has had prior anesthetic.     History of anesthetic complications  - PONV.      ROS/MED HX  ENT/Pulmonary:       Neurologic:       Cardiovascular:       METS/Exercise Tolerance:     Hematologic:       Musculoskeletal:   (+) arthritis,     GI/Hepatic:       Renal/Genitourinary:       Endo:       Psychiatric/Substance Use:        Infectious Disease:       Malignancy:   (+) Malignancy, History of Other.Other CA endometrial status post.    Other:            Physical Exam    Airway  airway exam normal      Mallampati: II   TM distance: > 3 FB   Neck ROM: full   Mouth opening: > 3 cm    Respiratory Devices and Support         Dental       (+) Completely normal teeth      Cardiovascular          Rhythm and rate: regular and normal     Pulmonary   pulmonary exam normal        breath sounds clear to auscultation           OUTSIDE LABS:  CBC:   Lab Results   Component Value Date    WBC 3.6 (L) 05/10/2023    WBC 4.2 12/20/2016    HGB 13.7 05/10/2023    HGB 13.0 12/20/2016    HCT 40.8 05/10/2023    HCT 39.5 12/20/2016     05/10/2023     12/20/2016     BMP:   Lab Results   Component Value Date     05/10/2023     12/20/2016    POTASSIUM 4.2 05/10/2023    POTASSIUM 4.0 12/20/2016    CHLORIDE 105 05/10/2023    CHLORIDE 102 12/20/2016    CO2 28 05/10/2023    CO2 28 12/20/2016    BUN 17.1 05/10/2023    BUN 16 12/20/2016    CR 0.85 05/10/2023    CR 0.94 12/20/2016     (H) 05/10/2023    GLC 88 12/20/2016     COAGS:   Lab Results   Component Value Date    INR 0.99 10/02/2014     POC:   Lab Results   Component Value Date    HCG Negative 08/19/2013     HEPATIC:   Lab Results   Component Value Date    ALBUMIN 4.4 05/10/2023    PROTTOTAL 6.9 05/10/2023    ALT 20 05/10/2023    AST 16 05/10/2023    ALKPHOS 84 05/10/2023    BILITOTAL 0.2 05/10/2023     OTHER:   Lab Results   Component Value Date    MORRIS 9.7 05/10/2023    CRP <2.9 12/20/2016       Anesthesia Plan    ASA Status:  2   NPO Status:  NPO Appropriate    Anesthesia Type: MAC.     - Reason for MAC: immobility needed, straight local not clinically adequate   Induction: Intravenous.   Maintenance: TIVA.        Consents    Anesthesia Plan(s) and associated risks, benefits, and realistic alternatives discussed. Questions answered and patient/representative(s) expressed  understanding.    - Discussed:     - Discussed with:  Patient      - Extended Intubation/Ventilatory Support Discussed: No.      - Patient is DNR/DNI Status: No    Use of blood products discussed: No .     Postoperative Care    Pain management: Oral pain medications, IV analgesics, Multi-modal analgesia.   PONV prophylaxis: Ondansetron (or other 5HT-3), Background Propofol Infusion     Comments:                Holden Beyer MD

## 2023-07-19 NOTE — ANESTHESIA CARE TRANSFER NOTE
Patient: Melly Huston    Procedure: Procedure(s):  Colonoscopy       Diagnosis: Screening cholesterol level [Z13.220]  Vitamin D deficiency [E55.9]  Pelvic pain in female [R10.2]  Chest mass [R22.2]  Diagnosis Additional Information: No value filed.    Anesthesia Type:   MAC     Note:see post op vitals    Oropharynx: oropharynx clear of all foreign objects and spontaneously breathing  Level of Consciousness: awake  Oxygen Supplementation: room air    Independent Airway: airway patency satisfactory and stable  Dentition: dentition unchanged  Vital Signs Stable: post-procedure vital signs reviewed and stable  Report to RN Given: handoff report given  Patient transferred to: Phase II    Handoff Report: Identifed the Patient, Identified the Reponsible Provider, Reviewed the pertinent medical history, Discussed the surgical course, Reviewed Intra-OP anesthesia mangement and issues during anesthesia, Set expectations for post-procedure period and Allowed opportunity for questions and acknowledgement of understanding      Vitals:  Vitals Value Taken Time   BP     Temp     Pulse     Resp     SpO2         Electronically Signed By: JUAN C Garcia CRNA  July 19, 2023  2:22 PM

## 2023-07-19 NOTE — H&P
Melly Huston  [id number]  female  57 year old      Reason for procedure/surgery: hx of endometrial cancer, last colonoscopy 2mm hyperplastic polyp, here for follow up    Patient Active Problem List   Diagnosis     Endometrial cancer (H)     OA (osteoarthritis) of knee     Pain of right scapula     Shoulder pain, right       Past Surgical History:    Past Surgical History:   Procedure Laterality Date     ARTHROPLASTY KNEE Left 3/24/2015    Procedure: ARTHROPLASTY KNEE;  Surgeon: Magan No MD;  Location:  OR     DAVINCI HYSTERECTOMY TOTAL, BILATERAL SALPINGO-OOPHORECTOMY, NODE DISSECTION, COMBINED  2013    Procedure: COMBINED DAVINCI HYSTERECTOMY TOTAL, SALPINGO-OOPHORECTOMY, NODE DISSECTION;  Davinci Total Laparoscopic Hysterectomy, Bilateral Salpingo-Oophorectomy, Bilateral pelvic and periaortic Lymph Node Dissection, cystoscopy and washings.  Anesthesia General with Pain block ;  Surgeon: Bonny Cruz MD;  Location: [facility] OR     KNEE SURGERY      left acl     REMOVE HARDWARE KNEE  3/24/2015    Procedure: REMOVE HARDWARE KNEE;  Surgeon: Magan No MD;  Location:  OR     ROTATOR CUFF REPAIR RT/LT      Right     WRIST SURGERY  2011    blood clot, right       Past Medical History:   Past Medical History:   Diagnosis Date     Arthritis      Blood clot in vein     negative coag testing.      Endometrial cancer (H)      PONV (postoperative nausea and vomiting)        Social History:   Social History     Tobacco Use     Smoking status: Former     Packs/day: 0.50     Years: 5.00     Pack years: 2.50     Types: Cigarettes     Quit date: 1990     Years since quittin.5     Smokeless tobacco: Never   Substance Use Topics     Alcohol use: Yes     Comment: 1-2 drinks/day       Family History:   Family History   Problem Relation Age of Onset     Arthritis Mother      Cerebrovascular Disease Mother      Lipids Mother      Diabetes Mother      Kidney Cancer Mother          kidney     Arthritis Father      Peptic Ulcer Disease Father      Lipids Father      Prostate Cancer Father      Diabetes Brother      Cancer Maternal Aunt         brain cancer, lung cancer     Lung Cancer Maternal Aunt      Brain Cancer Maternal Aunt      Prostate Cancer Brother          brother, prostate     Hypertension Brother      Cancer Paternal Uncle         prostate x 2       Allergies: No Known Allergies    Active Medications:   Current Outpatient Medications   Medication Sig Dispense Refill     Multiple Vitamins-Minerals (MULTIVITAMIN PO) Take by mouth daily       VITAMIN D, CHOLECALCIFEROL, PO Take by mouth daily         Systemic Review:   CONSTITUTIONAL: NEGATIVE for fever, chills, change in weight  ENT/MOUTH: NEGATIVE for ear, mouth and throat problems  RESP: NEGATIVE for significant cough or SOB  CV: NEGATIVE for chest pain, palpitations or peripheral edema    Physical Examination:   Vital Signs: LMP 07/10/2013   GENERAL: healthy, alert and no distress  NECK: no adenopathy, no asymmetry, masses, or scars  RESP: lungs clear to auscultation - no rales, rhonchi or wheezes  CV: regular rate and rhythm, normal S1 S2, no S3 or S4, no murmur, click or rub, no peripheral edema and peripheral pulses strong  ABDOMEN: soft, nontender, no hepatosplenomegaly, no masses and bowel sounds normal  MS: no gross musculoskeletal defects noted, no edema    Plan: Appropriate to proceed as scheduled.      Nickie Jiménez MD  7/19/2023    PCP:  No Ref-Primary, Physician

## 2023-07-19 NOTE — ANESTHESIA POSTPROCEDURE EVALUATION
Patient: Melly Huston    Procedure: Procedure(s):  Colonoscopy       Anesthesia Type:  MAC    Note:  Disposition: Outpatient   Postop Pain Control: Uneventful            Sign Out: Well controlled pain   PONV: No   Neuro/Psych: Uneventful            Sign Out: Acceptable/Baseline neuro status   Airway/Respiratory: Uneventful            Sign Out: Acceptable/Baseline resp. status   CV/Hemodynamics: Uneventful            Sign Out: Acceptable CV status   Other NRE: NONE   DID A NON-ROUTINE EVENT OCCUR? No           Last vitals:  Vitals Value Taken Time   /82 07/19/23 1445   Temp 36.5  C (97.7  F) 07/19/23 1445   Pulse     Resp 16 07/19/23 1445   SpO2 96 % 07/19/23 1445       Electronically Signed By: Holden Beyer MD  July 19, 2023  3:57 PM

## 2023-07-24 NOTE — PROGRESS NOTES
HPI:    Last visit with us 5/10/2023. She still has throat clearing. She is not sure if this is post nasal drip or reflux/GI related. Recent Chest CT scan (5/11/2023) did not show any acute findings. Family h/o increased cholesterol. No other HEENT, cardiopulmonary, abdominal, , GYN, neurological, systemic, psychiatric, lymphatic, endocrine, vascular complaints. She has very old remote brief smoking history.       Past Medical History:   Diagnosis Date    Arthritis     Blood clot in vein 2010    negative coag testing.     Endometrial cancer (H)     PONV (postoperative nausea and vomiting)      Past Surgical History:   Procedure Laterality Date    ARTHROPLASTY KNEE Left 3/24/2015    Procedure: ARTHROPLASTY KNEE;  Surgeon: Magan No MD;  Location: [facility] OR    COLONOSCOPY N/A 7/19/2023    Procedure: Colonoscopy;  Surgeon: Nickie Jiménez MD;  Location: [facility] OR    DAVINCI HYSTERECTOMY TOTAL, BILATERAL SALPINGO-OOPHORECTOMY, NODE DISSECTION, COMBINED  8/19/2013    Procedure: COMBINED DAVINCI HYSTERECTOMY TOTAL, SALPINGO-OOPHORECTOMY, NODE DISSECTION;  Davinci Total Laparoscopic Hysterectomy, Bilateral Salpingo-Oophorectomy, Bilateral pelvic and periaortic Lymph Node Dissection, cystoscopy and washings.  Anesthesia General with Pain block ;  Surgeon: Bonny Cruz MD;  Location: [facility] OR    KNEE SURGERY  1998    left acl    REMOVE HARDWARE KNEE  3/24/2015    Procedure: REMOVE HARDWARE KNEE;  Surgeon: Magan No MD;  Location:  OR    ROTATOR CUFF REPAIR RT/LT  2012    Right    WRIST SURGERY  2011    blood clot, right     PE:    Vitals noted, gen, nad, cooperative, alert, neck supple nl rom, lungs with good air movement and clear, RRR, S1, S2, no MRG, abdomen, no acute findings. Grossly normal neurological exam.     A/P:    1. Mammogram was done 5/9/2023  2. Immunizations; Pfizer COVID x 2. Moderna x 1. Td/Tap 3/19/2015. Check with insurance regarding Shingrix  3. Colonoscopy; 10/31/2013 and repeat  in 5 years. Repeat 7/19/2023 was done and repeat in 10 years.   4. Bone density; she does not want a DEXA scan but wants Vit D checked 5/10/2023 low at 15; she should be on supplementation.   5. Dermatology; she does not feel she needs to see dermatology for a skin cancer check.   6. Lipids; , HDL 65 and TG's 288. Sent in Rx. For 10 mg Atorvastatin and will see her in 3 months and check labs.   7. Chest wall pain and mass? Pelvic pain and h/o endometrial cancer. She was seen in GYN clinic 6/13/2023.  CT chest/adomen/pelvis was done 5/11/2023 that was unremarkable.   8. Reflux and some cough. As above ordered CT imaging. She can try OTC antacid and may  Need EGD will discuss at next visit. Discussed next step would be have her see ENT for direct laryngoscopy (she wants to wait). Empiric PPI or H2 blocker she can try. She wants to wain on EGD as well.    9. Glucose 5/10/2023 was 115 and ordered A1c    30 minutes spent on the date of the encounter doing chart review, history and exam, documentation and further activities as noted above

## 2023-07-25 ENCOUNTER — OFFICE VISIT (OUTPATIENT)
Dept: INTERNAL MEDICINE | Facility: CLINIC | Age: 58
End: 2023-07-25
Payer: COMMERCIAL

## 2023-07-25 VITALS
HEART RATE: 77 BPM | OXYGEN SATURATION: 95 % | WEIGHT: 168.3 LBS | DIASTOLIC BLOOD PRESSURE: 83 MMHG | BODY MASS INDEX: 29.09 KG/M2 | SYSTOLIC BLOOD PRESSURE: 127 MMHG

## 2023-07-25 DIAGNOSIS — E78.00 HIGH BLOOD CHOLESTEROL: Primary | ICD-10-CM

## 2023-07-25 PROCEDURE — 99214 OFFICE O/P EST MOD 30 MIN: CPT | Performed by: INTERNAL MEDICINE

## 2023-07-25 RX ORDER — ATORVASTATIN CALCIUM 10 MG/1
10 TABLET, FILM COATED ORAL DAILY
Qty: 90 TABLET | Refills: 3 | Status: SHIPPED | OUTPATIENT
Start: 2023-07-25

## 2023-07-25 NOTE — NURSING NOTE
Melly Huston is a 57 year old female patient that presents today in clinic for the following:    Chief Complaint   Patient presents with    Follow Up     2 month follow up; still feeling congested      The patient's allergies and medications were reviewed as noted. A set of vitals were recorded as noted without incident: /83 (BP Location: Right arm, Patient Position: Sitting, Cuff Size: Adult Regular)   Pulse 77   Wt 76.3 kg (168 lb 4.8 oz)   LMP 07/10/2013   SpO2 95%   BMI 29.09 kg/m  . The patient does not have any other questions for the provider.    Nazia Hill, EMT at 10:43 AM on 7/25/2023

## 2023-10-29 NOTE — PROGRESS NOTES
HPI:      Last visit with us 7/25/2023 and additional information in that note. Overall doing well. She tries to exercise every day. She has not started a statin medication for elevated LDL. She has been working on exercise/diet. She does not have a strong family h/o low bone density. She does not want any vaccines today. Otherwise, no additional HEENT, cardiopulmonary, abdominal , GYN neurological, systemic, psychiatric, lymphatic, endocrine, vascular complaints.       Past Medical History:   Diagnosis Date    Arthritis     Blood clot in vein 2010    negative coag testing.     Endometrial cancer (H)     PONV (postoperative nausea and vomiting)      Past Surgical History:   Procedure Laterality Date    ARTHROPLASTY KNEE Left 3/24/2015    Procedure: ARTHROPLASTY KNEE;  Surgeon: Magan No MD;  Location:  OR    COLONOSCOPY N/A 7/19/2023    Procedure: Colonoscopy;  Surgeon: Nickie Jiménez MD;  Location: [facility]    DAVINCI HYSTERECTOMY TOTAL, BILATERAL SALPINGO-OOPHORECTOMY, NODE DISSECTION, COMBINED  8/19/2013    Procedure: COMBINED DAVINCI HYSTERECTOMY TOTAL, SALPINGO-OOPHORECTOMY, NODE DISSECTION;  Davinci Total Laparoscopic Hysterectomy, Bilateral Salpingo-Oophorectomy, Bilateral pelvic and periaortic Lymph Node Dissection, cystoscopy and washings.  Anesthesia General with Pain block ;  Surgeon: Bonny Cruz MD;  Location: [facility] OR    KNEE SURGERY  1998    left acl    REMOVE HARDWARE KNEE  3/24/2015    Procedure: REMOVE HARDWARE KNEE;  Surgeon: Magan No MD;  Location:  OR    ROTATOR CUFF REPAIR RT/LT  2012    Right    WRIST SURGERY  2011    blood clot, right     PE:    Vitals noted, gen, nad, cooperative, alert, neck supple nl rom, lungs with good air movement, RRR, S1, S2, no MRG, abdomen, no acute finding. Grossly normal neurological exam.     Results for orders placed or performed in visit on 10/30/23   UA with Microscopic reflex to Culture - lab collect     Status: Abnormal     Specimen: Urine, Midstream   Result Value Ref Range    Color Urine Light Yellow Colorless, Straw, Light Yellow, Yellow    Appearance Urine Clear Clear    Glucose Urine Negative Negative mg/dL    Bilirubin Urine Negative Negative    Ketones Urine Negative Negative mg/dL    Specific Gravity Urine 1.020 1.003 - 1.035    Blood Urine Negative Negative    pH Urine 5.0 5.0 - 7.0    Protein Albumin Urine Negative Negative mg/dL    Urobilinogen Urine Normal Normal, 2.0 mg/dL    Nitrite Urine Negative Negative    Leukocyte Esterase Urine Moderate (A) Negative    RBC Urine 1 <=2 /HPF    WBC Urine 3 <=5 /HPF    Squamous Epithelials Urine 4 (H) <=1 /HPF    Narrative    Urine Culture ordered based on laboratory criteria   Hemoglobin A1c     Status: Normal   Result Value Ref Range    Hemoglobin A1C 5.5 <5.7 %   Hepatic panel     Status: Normal   Result Value Ref Range    Protein Total 6.6 6.4 - 8.3 g/dL    Albumin 4.3 3.5 - 5.2 g/dL    Bilirubin Total <0.2 <=1.2 mg/dL    Alkaline Phosphatase 72 35 - 104 U/L    AST 16 0 - 45 U/L    ALT 15 0 - 50 U/L    Bilirubin Direct <0.20 0.00 - 0.30 mg/dL   Lipid panel reflex to direct LDL Fasting     Status: Abnormal   Result Value Ref Range    Cholesterol 268 (H) <200 mg/dL    Triglycerides 277 (H) <150 mg/dL    Direct Measure HDL 61 >=50 mg/dL    LDL Cholesterol Calculated 152 (H) <=100 mg/dL    Non HDL Cholesterol 207 (H) <130 mg/dL    Narrative    Cholesterol  Desirable:  <200 mg/dL    Triglycerides  Normal:  Less than 150 mg/dL  Borderline High:  150-199 mg/dL  High:  200-499 mg/dL  Very High:  Greater than or equal to 500 mg/dL    Direct Measure HDL  Female:  Greater than or equal to 50 mg/dL   Male:  Greater than or equal to 40 mg/dL    LDL Cholesterol  Desirable:  <100mg/dL  Above Desirable:  100-129 mg/dL   Borderline High:  130-159 mg/dL   High:  160-189 mg/dL   Very High:  >= 190 mg/dL    Non HDL Cholesterol  Desirable:  130 mg/dL  Above Desirable:  130-159 mg/dL  Borderline High:   160-189 mg/dL  High:  190-219 mg/dL  Very High:  Greater than or equal to 220 mg/dL   Vitamin D Deficiency     Status: Normal   Result Value Ref Range    Vitamin D, Total (25-Hydroxy) 25 20 - 50 ng/mL    Narrative    Season, race, dietary intake, and treatment affect the concentration of 25-hydroxy-Vitamin D. Values may decrease during winter months and increase during summer months.    Vitamin D determination is routinely performed by an immunoassay specific for 25 hydroxyvitamin D3.  If an individual is on vitamin D2(ergocalciferol) supplementation, please specify 25 [place] vitamin D2 and D3 level determination by LCMSMS test VITD23.       A/P:    1. Mammogram was done 5/9/2023  2. Immunizations; Pfizer COVID x 2. Moderna x 1. Td/Tap 3/19/2015. Check with insurance regarding Shingrix  3. Colonoscopy; 10/31/2013 and repeat in 5 years. Repeat 7/19/2023 was done and repeat in 10 years.   4. Bone density; she does not want a DEXA scan but wants Vit D checked 5/10/2023 low at 15; she should be on supplementation. Reordered Vit D today 10/30/2023.   5. Dermatology; she does not feel she needs to see dermatology for a skin cancer check.   6. Lipids; , HDL 65 and TG's 288. She is not taking 10 mg Atorvastatin. Lipids ordered 10/29/2023.   7. Chest wall pain and mass? Pelvic pain and h/o endometrial cancer. She was seen in GYN clinic 6/13/2023.  CT chest/adomen/pelvis was done 5/11/2023 that was unremarkable.   8. Reflux and some cough. As above ordered CT imaging. She can try OTC antacid and may  Need EGD will discuss at next visit. Placed  ENT for direct laryngoscopy. She has not used.empiric PPI or H2 blocker she can try. She wants to wain on EGD as well.    9. Glucose 5/10/2023 was 115 and ordered A1c on 10/29/2023      30 minutes spent on the date of the encounter doing chart review, history and exam, documentation and further activities as noted above

## 2023-10-30 ENCOUNTER — OFFICE VISIT (OUTPATIENT)
Dept: INTERNAL MEDICINE | Facility: CLINIC | Age: 58
End: 2023-10-30
Payer: COMMERCIAL

## 2023-10-30 ENCOUNTER — LAB (OUTPATIENT)
Dept: LAB | Facility: CLINIC | Age: 58
End: 2023-10-30
Payer: COMMERCIAL

## 2023-10-30 VITALS
DIASTOLIC BLOOD PRESSURE: 81 MMHG | HEART RATE: 78 BPM | BODY MASS INDEX: 28.36 KG/M2 | SYSTOLIC BLOOD PRESSURE: 124 MMHG | OXYGEN SATURATION: 97 % | WEIGHT: 164.1 LBS

## 2023-10-30 DIAGNOSIS — R10.2 PELVIC PAIN IN FEMALE: ICD-10-CM

## 2023-10-30 DIAGNOSIS — R09.89 THROAT CLEARING: ICD-10-CM

## 2023-10-30 DIAGNOSIS — Z13.220 SCREENING CHOLESTEROL LEVEL: ICD-10-CM

## 2023-10-30 DIAGNOSIS — E55.9 VITAMIN D DEFICIENCY: ICD-10-CM

## 2023-10-30 DIAGNOSIS — E78.00 HIGH BLOOD CHOLESTEROL: ICD-10-CM

## 2023-10-30 DIAGNOSIS — R22.2 CHEST MASS: ICD-10-CM

## 2023-10-30 DIAGNOSIS — R73.09 INCREASED GLUCOSE LEVEL: ICD-10-CM

## 2023-10-30 DIAGNOSIS — R73.09 INCREASED GLUCOSE LEVEL: Primary | ICD-10-CM

## 2023-10-30 LAB
ALBUMIN SERPL BCG-MCNC: 4.3 G/DL (ref 3.5–5.2)
ALBUMIN UR-MCNC: NEGATIVE MG/DL
ALP SERPL-CCNC: 72 U/L (ref 35–104)
ALT SERPL W P-5'-P-CCNC: 15 U/L (ref 0–50)
APPEARANCE UR: CLEAR
AST SERPL W P-5'-P-CCNC: 16 U/L (ref 0–45)
BILIRUB DIRECT SERPL-MCNC: <0.2 MG/DL (ref 0–0.3)
BILIRUB SERPL-MCNC: <0.2 MG/DL
BILIRUB UR QL STRIP: NEGATIVE
CHOLEST SERPL-MCNC: 268 MG/DL
COLOR UR AUTO: ABNORMAL
GLUCOSE UR STRIP-MCNC: NEGATIVE MG/DL
HBA1C MFR BLD: 5.5 %
HDLC SERPL-MCNC: 61 MG/DL
HGB UR QL STRIP: NEGATIVE
KETONES UR STRIP-MCNC: NEGATIVE MG/DL
LDLC SERPL CALC-MCNC: 152 MG/DL
LEUKOCYTE ESTERASE UR QL STRIP: ABNORMAL
NITRATE UR QL: NEGATIVE
NONHDLC SERPL-MCNC: 207 MG/DL
PH UR STRIP: 5 [PH] (ref 5–7)
PROT SERPL-MCNC: 6.6 G/DL (ref 6.4–8.3)
RBC URINE: 1 /HPF
SP GR UR STRIP: 1.02 (ref 1–1.03)
SQUAMOUS EPITHELIAL: 4 /HPF
TRIGL SERPL-MCNC: 277 MG/DL
UROBILINOGEN UR STRIP-MCNC: NORMAL MG/DL
VIT D+METAB SERPL-MCNC: 25 NG/ML (ref 20–50)
WBC URINE: 3 /HPF

## 2023-10-30 PROCEDURE — 83036 HEMOGLOBIN GLYCOSYLATED A1C: CPT | Performed by: INTERNAL MEDICINE

## 2023-10-30 PROCEDURE — 81001 URINALYSIS AUTO W/SCOPE: CPT | Performed by: PATHOLOGY

## 2023-10-30 PROCEDURE — 87086 URINE CULTURE/COLONY COUNT: CPT | Performed by: INTERNAL MEDICINE

## 2023-10-30 PROCEDURE — 80076 HEPATIC FUNCTION PANEL: CPT | Performed by: PATHOLOGY

## 2023-10-30 PROCEDURE — 80061 LIPID PANEL: CPT | Performed by: PATHOLOGY

## 2023-10-30 PROCEDURE — 36415 COLL VENOUS BLD VENIPUNCTURE: CPT | Performed by: PATHOLOGY

## 2023-10-30 PROCEDURE — 99214 OFFICE O/P EST MOD 30 MIN: CPT | Performed by: INTERNAL MEDICINE

## 2023-10-30 PROCEDURE — 82306 VITAMIN D 25 HYDROXY: CPT | Performed by: INTERNAL MEDICINE

## 2023-10-30 PROCEDURE — 99000 SPECIMEN HANDLING OFFICE-LAB: CPT | Performed by: PATHOLOGY

## 2023-10-30 NOTE — PROGRESS NOTES
Melly is a 58 year old that presents in clinic today for the following:     Chief Complaint   Patient presents with    Follow Up         10/30/2023     7:25 AM   Additional Questions   Roomed by Marcy ACOSTA     Screenings from encounters over the past 10 days    No data recorded       ISSA Barrios at 7:29 AM on 10/30/2023

## 2023-10-31 LAB — BACTERIA UR CULT: NORMAL

## 2023-11-02 NOTE — TELEPHONE ENCOUNTER
FUTURE VISIT INFORMATION      FUTURE VISIT INFORMATION:  Date: 1/4/2024  Time: 8:20 AM  Location: [facility] [place] [place]  REFERRAL INFORMATION:  Referring provider:  Julius Duong MD   Referring providers clinic:  [facility]  Reason for visit/diagnosis  throat clearing and should have direct laryngoscopy, Referral from Julius Duong, Referral notes in [facility], pt made appt for [facility] location     RECORDS REQUESTED FROM:       Clinic name Comments Records Status Imaging Status   [facility] 10/30/23 OV with Julius Duong MD  [facility] CT chest abd pel 5/11/23 Epic PACS   Procedure 12/30/2016 PFT Epic

## 2024-01-04 ENCOUNTER — OFFICE VISIT (OUTPATIENT)
Dept: OTOLARYNGOLOGY | Facility: CLINIC | Age: 59
End: 2024-01-04
Payer: COMMERCIAL

## 2024-01-04 ENCOUNTER — PRE VISIT (OUTPATIENT)
Dept: OTOLARYNGOLOGY | Facility: CLINIC | Age: 59
End: 2024-01-04

## 2024-01-04 VITALS
DIASTOLIC BLOOD PRESSURE: 92 MMHG | SYSTOLIC BLOOD PRESSURE: 135 MMHG | WEIGHT: 164 LBS | BODY MASS INDEX: 28 KG/M2 | OXYGEN SATURATION: 97 % | HEIGHT: 64 IN | HEART RATE: 70 BPM

## 2024-01-04 DIAGNOSIS — R09.89 PHLEGM IN THROAT: Primary | ICD-10-CM

## 2024-01-04 DIAGNOSIS — R09.89 THROAT CLEARING: ICD-10-CM

## 2024-01-04 PROCEDURE — 31575 DIAGNOSTIC LARYNGOSCOPY: CPT | Performed by: REGISTERED NURSE

## 2024-01-04 PROCEDURE — 99213 OFFICE O/P EST LOW 20 MIN: CPT | Mod: 25 | Performed by: REGISTERED NURSE

## 2024-01-04 ASSESSMENT — PAIN SCALES - GENERAL: PAINLEVEL: NO PAIN (0)

## 2024-01-04 NOTE — PROGRESS NOTES
[facility]   Head and Neck Surgery  January 4, 2024    Referring Provider:   Julius Duong MD     HPI: Melly Huston is a 58 year old female who presents today for evaluation of chronic throat clearing and excess phlegm in throat. Symptoms have been present for quite a few months. Needs to clear throat often in the morning. Phlegm can worsen at night when lying down, especially after eating a big meal. Does have some reflux/heartburn symptoms as well. Patient denies any dysphagia, odynophagia, new sore throat, mouth pain, ear pain, bleeding from the mouth, hemoptysis, voice changes or lumps/bumps of the neck. Denies history of smoking. Denies nasal congestion or allergies.    Past Medical History:  Past Medical History:   Diagnosis Date    Arthritis     Blood clot in vein 2010    negative coag testing.     Endometrial cancer (H)     PONV (postoperative nausea and vomiting)      Past Surgical History:  Past Surgical History:   Procedure Laterality Date    ARTHROPLASTY KNEE Left 03/24/2015    Procedure: ARTHROPLASTY KNEE;  Surgeon: Magan No MD;  Location:  OR    COLONOSCOPY N/A 07/19/2023    Procedure: Colonoscopy;  Surgeon: Nickie Jiménez MD;  [place] [facility] [place]    DAVINCI HYSTERECTOMY TOTAL, BILATERAL SALPINGO-OOPHORECTOMY, NODE DISSECTION, COMBINED  08/19/2013    Procedure: COMBINED DAVINCI HYSTERECTOMY TOTAL, SALPINGO-OOPHORECTOMY, NODE DISSECTION;  Davinci Total Laparoscopic Hysterectomy, Bilateral Salpingo-Oophorectomy, Bilateral pelvic and periaortic Lymph Node Dissection, cystoscopy and washings.  Anesthesia General with Pain block ;  Surgeon: Bonny Cruz MD;  Location: [facility] OR    GYN SURGERY  8/2013    KNEE SURGERY  01/01/1998    left acl    REMOVE HARDWARE KNEE  03/24/2015    Procedure: REMOVE HARDWARE KNEE;  Surgeon: Magan No MD;  Location:  OR    ROTATOR CUFF REPAIR RT/LT  01/01/2012    Right    WRIST SURGERY  01/01/2011    blood clot,  "right       Medications:  Current Outpatient Medications   Medication Sig Dispense Refill    atorvastatin (LIPITOR) 10 MG tablet Take 1 tablet (10 mg) by mouth daily 90 tablet 3    CALCIUM PO       Multiple Vitamins-Minerals (MULTIVITAMIN PO) Take by mouth daily      VITAMIN D, CHOLECALCIFEROL, PO Take by mouth daily         Allergies:  No Known Allergies     Social History:  Social History     Tobacco Use    Smoking status: Former     Packs/day: 0.00     Years: 5.00     Additional pack years: 0.00     Total pack years: 0.00     Types: Cigarettes     Quit date: 1990     Years since quittin.0    Smokeless tobacco: Never   Substance Use Topics    Alcohol use: Yes     Comment: weekends    Drug use: No     ROS: 10 point ROS neg other than the symptoms noted above in the HPI.    Physical Exam:    BP (!) 135/92   Pulse 70   Ht 1.62 m (5' 3.78\")   Wt 74.4 kg (164 lb)   LMP 07/10/2013   SpO2 97%   BMI 28.35 kg/m       Constitutional:  The patient was unaccompanied, well-groomed, and in no acute distress.     Skin: Normal:  warm and pink without rash    Neurologic: Alert and oriented x 3.  CN's III-XII within normal limits.  Voice normal.    Psychiatric: The patient's affect was calm, cooperative, and appropriate.     Communication:  Normal; communicates verbally, normal voice quality.    Respiratory: Breathing comfortably without stridor or exertion of accessory muscles.    Head/Face:  Normocephalic and atraumatic.  No lesions or scars.   Salivary glands -  Normal size, no tenderness, swelling, or palpable masses    Eyes: Pupils were equal and reactive.  Extraocular movement intact.    Ears: Pinnae and tragus non-tender.  Non-occluding cerumen in canals. EAC's and TM's were clear.     Oral Cavity: Normal tongue, floor of mouth, buccal mucosa, and palate.  No lesions or masses on inspection.     Oropharynx: Normal mucosa, palate symmetric with normal elevation. No abnormal lymph tissue in the oropharynx.    "   Neck: Supple with normal laryngeal and tracheal landmarks.  The parotid beds were without masses.  No palpable thyroid.  Normal range of motion.    Lymphatic: There is no palpable lymphadenopathy in the neck.      Flexible fiberoptic laryngoscopy: Scope exam was indicated due to chronic throat clearing. Verbal consent was obtained. The nasal cavity was prepped with an aerosolized solution of topical anesthetic and vasoconstrictive agent. The scope was passed through the anterior nasal cavity and advanced. Inspection of the nasopharynx revealed no gross abnormality. The base of tongue and vallecula are normal. The epiglottis, AE folds, false cords, true cords, arytenoids are normal.  Inspection of the larynx revealed bilaterally mobile vocal cords. Pyriform sinuses are symmetric. The airway is patent. Procedure tolerated well with no immediate complications noted.        Assessment/Plan:  Patient with history of throat clearing and excess phlegm. Scope exam is normal today without any evidence of vocal cord polyps or concerning lesions. Patient does have quite a bit of post nasal drip. Recommend starting Flonase nasal spray.      There is not any significant post cricoid edema to signal severe laryngopharyngeal reflux but, because symptoms seem to worsen when lying down after a big meal and are associated with GERD symptoms, recommend management of laryngopharyngeal reflux. Patient education provided to patient today. If Flonase does not help phlegm symptoms, patient may want to trial a PPI for further reflux management as reflux can cause excess mucus/phlegm.     Patient can follow up as needed if symptoms worsen or fail to improve with management strategies discussed above.    Kylie Dimas DNP, APRN, CNP  Otolaryngology  Head & Neck Surgery  [phone]    30 minutes spent on the date of the encounter doing chart review, history and exam, documentation and further activities per the note excluding time  performing scope exam.

## 2024-01-04 NOTE — LETTER
1/4/2024       RE: Melly Huston  [address] [place] [facility] [address]     Dear Colleague,    Thank you for referring your patient, Melly Huston, to the [facility] [place] at [facility]. Please see a copy of my visit note below.    [facility]   Head and Neck Surgery  January 4, 2024    Referring Provider:   Julius Duong MD     HPI: Melly Huston is a 58 year old female who presents today for evaluation of chronic throat clearing and excess phlegm in throat. Symptoms have been present for quite a few months. Needs to clear throat often in the morning. Phlegm can worsen at night when lying down, especially after eating a big meal. Does have some reflux/heartburn symptoms as well. Patient denies any dysphagia, odynophagia, new sore throat, mouth pain, ear pain, bleeding from the mouth, hemoptysis, voice changes or lumps/bumps of the neck. Denies history of smoking. Denies nasal congestion or allergies.    Past Medical History:  Past Medical History:   Diagnosis Date     Arthritis      Blood clot in vein 2010    negative coag testing.      Endometrial cancer (H)      PONV (postoperative nausea and vomiting)      Past Surgical History:  Past Surgical History:   Procedure Laterality Date     ARTHROPLASTY KNEE Left 03/24/2015    Procedure: ARTHROPLASTY KNEE;  Surgeon: Magan No MD;  Location:  OR     COLONOSCOPY N/A 07/19/2023    Procedure: Colonoscopy;  Surgeon: Nickie Jmiénez MD;  [place] [facility] [place]     DAVINCI HYSTERECTOMY TOTAL, BILATERAL SALPINGO-OOPHORECTOMY, NODE DISSECTION, COMBINED  08/19/2013    Procedure: COMBINED DAVINCI HYSTERECTOMY TOTAL, SALPINGO-OOPHORECTOMY, NODE DISSECTION;  Davinci Total Laparoscopic Hysterectomy, Bilateral Salpingo-Oophorectomy, Bilateral pelvic and periaortic Lymph Node Dissection, cystoscopy and washings.  Anesthesia General with Pain  "block ;  Surgeon: Bonny Cruz MD;  Location: [facility] OR     GYN SURGERY  2013     KNEE SURGERY  1998    left acl     REMOVE HARDWARE KNEE  2015    Procedure: REMOVE HARDWARE KNEE;  Surgeon: Magan No MD;  Location: [facility] OR     ROTATOR CUFF REPAIR RT/LT  2012    Right     WRIST SURGERY  2011    blood clot, right       Medications:  Current Outpatient Medications   Medication Sig Dispense Refill     atorvastatin (LIPITOR) 10 MG tablet Take 1 tablet (10 mg) by mouth daily 90 tablet 3     CALCIUM PO        Multiple Vitamins-Minerals (MULTIVITAMIN PO) Take by mouth daily       VITAMIN D, CHOLECALCIFEROL, PO Take by mouth daily         Allergies:  No Known Allergies     Social History:  Social History     Tobacco Use     Smoking status: Former     Packs/day: 0.00     Years: 5.00     Additional pack years: 0.00     Total pack years: 0.00     Types: Cigarettes     Quit date: 1990     Years since quittin.0     Smokeless tobacco: Never   Substance Use Topics     Alcohol use: Yes     Comment: weekends     Drug use: No     ROS: 10 point ROS neg other than the symptoms noted above in the HPI.    Physical Exam:    BP (!) 135/92   Pulse 70   Ht 1.62 m (5' 3.78\")   Wt 74.4 kg (164 lb)   LMP 07/10/2013   SpO2 97%   BMI 28.35 kg/m       Constitutional:  The patient was unaccompanied, well-groomed, and in no acute distress.     Skin: Normal:  warm and pink without rash    Neurologic: Alert and oriented x 3.  CN's III-XII within normal limits.  Voice normal.    Psychiatric: The patient's affect was calm, cooperative, and appropriate.     Communication:  Normal; communicates verbally, normal voice quality.    Respiratory: Breathing comfortably without stridor or exertion of accessory muscles.    Head/Face:  Normocephalic and atraumatic.  No lesions or scars.   Salivary glands -  Normal size, no tenderness, swelling, or palpable masses    Eyes: Pupils were equal and reactive.  " Extraocular movement intact.    Ears: Pinnae and tragus non-tender.  Non-occluding cerumen in canals. EAC's and TM's were clear.     Oral Cavity: Normal tongue, floor of mouth, buccal mucosa, and palate.  No lesions or masses on inspection.     Oropharynx: Normal mucosa, palate symmetric with normal elevation. No abnormal lymph tissue in the oropharynx.      Neck: Supple with normal laryngeal and tracheal landmarks.  The parotid beds were without masses.  No palpable thyroid.  Normal range of motion.    Lymphatic: There is no palpable lymphadenopathy in the neck.      Flexible fiberoptic laryngoscopy: Scope exam was indicated due to chronic throat clearing. Verbal consent was obtained. The nasal cavity was prepped with an aerosolized solution of topical anesthetic and vasoconstrictive agent. The scope was passed through the anterior nasal cavity and advanced. Inspection of the nasopharynx revealed no gross abnormality. The base of tongue and vallecula are normal. The epiglottis, AE folds, false cords, true cords, arytenoids are normal.  Inspection of the larynx revealed bilaterally mobile vocal cords. Pyriform sinuses are symmetric. The airway is patent. Procedure tolerated well with no immediate complications noted.        Assessment/Plan:  Patient with history of throat clearing and excess phlegm. Scope exam is normal today without any evidence of vocal cord polyps or concerning lesions. Patient does have quite a bit of post nasal drip. Recommend starting Flonase nasal spray.      There is not any significant post cricoid edema to signal severe laryngopharyngeal reflux but, because symptoms seem to worsen when lying down after a big meal and are associated with GERD symptoms, recommend management of laryngopharyngeal reflux. Patient education provided to patient today. If Flonase does not help phlegm symptoms, patient may want to trial a PPI for further reflux management as reflux can cause excess mucus/phlegm.      Patient can follow up as needed if symptoms worsen or fail to improve with management strategies discussed above.    Kylie Dimas DNP, APRN, CNP  Otolaryngology  Head & Neck Surgery  [phone]    30 minutes spent on the date of the encounter doing chart review, history and exam, documentation and further activities per the note excluding time performing scope exam.      Again, thank you for allowing me to participate in the care of your patient.      Sincerely,    Radha Dimas, NP

## 2024-01-04 NOTE — PATIENT INSTRUCTIONS
- You were seen in the ENT Clinic today by Kylie Dimas NP.   - Recommend starting Flonase for post nasal drip and excess phlegm. If this does not improve excess mucus symptoms, may want to start a PPI for further GERD management.  - Please send a [employer] message or call the ENT clinic at [phone] with any questions or concerns.    Laryngopharyngeal Reflux (LPR)    Characteristics of LPR  Laryngopharyngeal reflux (LPR) is also known as extraesophageal reflux disease. It results from chronic acid and pepsin exposure to the larynx. Patients are usually unaware of LPR and, unlike Gastroesophageal Reflux Disease (GERD) patients, do not usually complain of heartburn (only 35% do complain). GERD occurs when stomach acid and enzymes backflow into the esophagus, causing heartburn and damage to the esophageal lining. LPR occurs when stomach acid and/or food enzymes backflow all the way back into the lower part of the throat (laryngopharynx).     Many people with LPR do not have symptoms of heartburn. Comped to the esophagus, the voice box and the back of the throat are significantly more sensitive to the effects of acid/pepsin on the surrounding tissues. Acid that passes quickly through the esophagus (food pipe) does not have a chance to irritate the area for too long. However, acid that pools in the throat around the voice box causes prolonged irritation, resulting in the symptoms of LPR.    Common Symptoms of LPR  - Hoarseness  - Chronic throat irritation  - Chronic cough  - Cough that wakes you from your sleep  - Thick or too much mucus  - Chronic throat-clearing  - Voice problems    Treatment of LPR    Diet Changes:    Different foods affect LPR by different mechanisms. These specific foods should be avoided or reduced drastically, or they will interfere with the healing process:    Caffeine, alcohol, chocolates and peppermints weaken the lower esophageal sphincter, which normally holds in the stomach  contents.    Citrus fruits, tomatoes (and other acidic foods), spicy deli meats and hot spices directly irritate the throat lining. This means that even if the medicines are working well, eating these foods will cause direct irration and inflammation of the throat lining.    Carbonated beverages (such as sodas and beer) bring acidic contents into the throat.    Behavior Changes:    Do not increase the pressure within the abdomen for at least 2 hours after eating (no bending over, exercising, or singing) as it will force contents back into the throat.    Do not over-distend the stomach (eat smaller meals throughout the day, instead of 3 larger meals).    Do not lie down within 3 hours after eating a meal. Do not eat a snack or drink before going to sleep.    Medicines    Proton pump inhibitors (PPIs) are the most effective medicines for the treatment of LPR.    When treatment is indicated, we typically start with a low-dose PPI (eg, omeprazole 20 mg) once daily, one half-hour before a meal, preferably in the morning. If symptoms do not start to improve within six to eight weeks of use, contact the ENT clinic to determine if the dose should be increased.     Please keep in mind the lag between the time you take the medicine and the time you start feeling symptom relief. People who stop taking the medicines typically feel fine for 1-3 weeks and then notice gradual return of symptoms. It takes a few weeks to get back to where they were before. Some people successfully come off of the medicines but need to follow a strict diet.

## 2024-04-29 ENCOUNTER — OFFICE VISIT (OUTPATIENT)
Dept: INTERNAL MEDICINE | Facility: CLINIC | Age: 59
End: 2024-04-29
Payer: COMMERCIAL

## 2024-04-29 VITALS
OXYGEN SATURATION: 96 % | HEART RATE: 78 BPM | SYSTOLIC BLOOD PRESSURE: 119 MMHG | WEIGHT: 157.4 LBS | DIASTOLIC BLOOD PRESSURE: 82 MMHG | HEIGHT: 64 IN | BODY MASS INDEX: 26.87 KG/M2

## 2024-04-29 DIAGNOSIS — K21.9 GASTROESOPHAGEAL REFLUX DISEASE, UNSPECIFIED WHETHER ESOPHAGITIS PRESENT: ICD-10-CM

## 2024-04-29 DIAGNOSIS — Z12.83 SKIN CANCER SCREENING: Primary | ICD-10-CM

## 2024-04-29 PROCEDURE — 99214 OFFICE O/P EST MOD 30 MIN: CPT | Performed by: INTERNAL MEDICINE

## 2024-04-29 NOTE — PROGRESS NOTES
Melly is a 58 year old that presents in clinic today for the following:     Chief Complaint   Patient presents with    Follow Up     Pt here for persistent coughing and high cholesterol           4/29/2024     7:10 AM   Additional Questions   Roomed by Margarita Tidwell   Accompanied by N/A     Screenings from encounters over the past 10 days    No data recorded       Margarita Tidwell at 7:16 AM on 4/29/2024

## 2024-04-29 NOTE — PROGRESS NOTES
"SUBJECTIVE:  Melly Huston is a 58 year old female with a PMHx of hyperlipidemia, vitamin D deficiency, endometrial cancer and chronic cough/reflux  who comes in for follow up.  Ms. Huston shares that her symptoms of chronic cough and throat clearing continue.  She had a visit with ENT in January who recommended Flonase for her symptoms, but this did not help.  She shares the symptoms are worse at night, when lying down and following a large meal, she denies feeling of \"heart burn\", however at night she occasionally develops wheezing.  She is exercising including swimming 3x per week, and changing her diet in an effort to lower her cholesterol.      Medications and allergies reviewed by me today.     ROS:   CONSTITUTIONAL: NEGATIVE for fever, chills, change in weight  INTEGUMENTARY/SKIN: NEGATIVE for worrisome rashes, moles or lesions  EYES: NEGATIVE for vision changes or irritation  ENT/MOUTH: POSITIVE for frequent throat clearing, cough  RESP:POSITIVE for cough-non productive and wheezing and NEGATIVE for SOB/dyspnea  CV: NEGATIVE for chest pain, palpitations or peripheral edema  GI: NEGATIVE for nausea, abdominal pain, heartburn, or change in bowel habits  : NEGATIVE for frequency, dysuria, or hematuria  MUSCULOSKELETAL: NEGATIVE for significant arthralgias or myalgia  NEURO: NEGATIVE for weakness, dizziness or paresthesias  PSYCHIATRIC: NEGATIVE for changes in mood or affect    OBJECTIVE:    /82 (BP Location: Right arm, Patient Position: Sitting, Cuff Size: Adult Regular)   Pulse 78   Ht 1.62 m (5' 3.78\")   Wt 71.4 kg (157 lb 6.4 oz)   LMP 07/10/2013   SpO2 96%   BMI 27.20 kg/m     Wt Readings from Last 1 Encounters:   04/29/24 71.4 kg (157 lb 6.4 oz)     GENERAL: alert and no distress  EYES: Eyes grossly normal to inspection, PERRL and conjunctivae and sclerae normal  RESP: lungs clear to auscultation - no rales, rhonchi or wheezes  CV: regular rate and rhythm, normal S1 S2, no S3 or S4, no " murmur, click or rub, no peripheral edema  MS: no gross musculoskeletal defects noted, no edema  SKIN: no suspicious lesions or rashes  NEURO: Normal strength and tone, mentation intact and speech normal  PSYCH: mentation appears normal, affect normal/bright     ASSESSMENT/PLAN:  Immunizations: COVID x3 last 2/1/22, Tdap 3/19/15  Colonoscopy: done on 7/19/23 repeat in 10 years  Mammogram: done 5/9/23, repeat yearly  HLD: cholesterol 268, HDL 61, , tgs 277 on 10/30/23.  Atorvastatin ordered at last visit, pt has not started yet, she wants to try diet/exercise first.  Elevated bg 115, A1c 5.5 on 10/30/23  Low vitamin D: was 15, 5/10/23, recheck 25, on vitamin D supplementation.  History of endometrial cancer: follows yearly with Ob/GYN, last visit 6/13/23  Chronic cough/throat clearing: visit with ENT Ms. Dimas 1/4/24 with flexible laryngoscopy without acute findings.  Tried Flonase, however this did not help. Will check PFTs for wheezing, ordered upper endoscopy for continued symptoms.  Dermatology: referral placed 4/29/24 for skin check    Pt should return to clinic for f/u with me in 1 month     Ronald Bloom  [employer] student  Apr 29, 2024    Pt was seen and plan of care discussed with Dr. Duong.     Staff note; Personally reviewed  Ms Huston's past medical history. I interviewed her and conducted a physical examination. I agree with Luz Elena Bloom's above assess and plan.     30 minutes spent on the date of the encounter doing chart review, history and exam, documentation and further activities as noted above exclusive of procedures and other billable interpretations

## 2024-05-03 ENCOUNTER — TELEPHONE (OUTPATIENT)
Dept: GASTROENTEROLOGY | Facility: CLINIC | Age: 59
End: 2024-05-03
Payer: COMMERCIAL

## 2024-05-03 NOTE — TELEPHONE ENCOUNTER
"Endoscopy Scheduling Screen    Have you had a positive Covid test in the last 14 days?  No    What is your communication preference for Instructions and/or Bowel Prep?   MyChart    What insurance is in the chart?  Other:  [employer]    Ordering/Referring Provider:     OLYA STARR      (If ordering provider performs procedure, schedule with ordering provider unless otherwise instructed. )    BMI: Estimated body mass index is 27.2 kg/m  as calculated from the following:    Height as of 4/29/24: 1.62 m (5' 3.78\").    Weight as of 4/29/24: 71.4 kg (157 lb 6.4 oz).     Sedation Ordered  moderate sedation.   If patient BMI > 50 do not schedule in [facility].    If patient BMI > 45 do not schedule at [facility].    Are you taking methadone or Suboxone?  No    Have you had difficulties, pain, or discomfort during past endoscopy procedures?  No    Are you taking any prescription medications for pain 3 or more times per week?   NO, No RN review required.    Do you have a history of malignant hyperthermia?  No    (Females) Are you currently pregnant?   No     Have you been diagnosed or told you have pulmonary hypertension?   No    Do you have an LVAD?  No    Have you been told you have moderate to severe sleep apnea?  No    Have you been told you have COPD, asthma, or any other lung disease?  No    Do you have any heart conditions?  No     Have you ever had or are you waiting for an organ transplant?  No. Continue scheduling, no site restrictions.    Have you had a stroke or transient ischemic attack (TIA aka \"mini stroke\" in the last 6 months?   No    Have you been diagnosed with or been told you have cirrhosis of the liver?   No    Are you currently on dialysis?   No    Do you need assistance transferring?   No    BMI: Estimated body mass index is 27.2 kg/m  as calculated from the following:    Height as of 4/29/24: 1.62 m (5' 3.78\").    Weight as of 4/29/24: 71.4 kg (157 lb 6.4 oz).     Is patients BMI > 40 and scheduling location " [facility]?  No    Do you take an injectable medication for weight loss or diabetes (excluding insulin)?  No    Do you take the medication Naltrexone?  No    Do you take blood thinners?  No       Prep   Are you currently on dialysis or do you have chronic kidney disease?  No    Do you have a diagnosis of diabetes?  No    Do you have a diagnosis of cystic fibrosis (CF)?  No    On a regular basis do you go 3 -5 days between bowel movements?  No    BMI > 40?  No    Preferred Pharmacy:    [facility] [phone]  [phone]  [facility] [facility] [address]  Phone: [phone] Fax: [phone] Alternate Fax: [phone], [phone]      Final Scheduling Details     Procedure scheduled  Upper endoscopy (EGD)    Surgeon:  Jorge Alberto     Date of procedure:  5/7     Pre-OP / PAC:   No - Not required for this site.    Location  [facility] - [facility] - Per order.    Sedation   Moderate Sedation - Per order.      Patient Reminders:   You will receive a call from a Nurse to review instructions and health history.  This assessment must be completed prior to your procedure.  Failure to complete the Nurse assessment may result in the procedure being cancelled.      On the day of your procedure, please designate an adult(s) who can drive you home stay with you for the next 24 hours. The medicines used in the exam will make you sleepy. You will not be able to drive.      You cannot take public transportation, ride share services, or non-medical taxi service without a responsible caregiver.  Medical transport services are allowed with the requirement that a responsible caregiver will receive you at your destination.  We require that drivers and caregivers are confirmed prior to your procedure.

## 2024-05-07 ENCOUNTER — HOSPITAL ENCOUNTER (OUTPATIENT)
Facility: AMBULATORY SURGERY CENTER | Age: 59
Discharge: HOME | End: 2024-05-07
Attending: INTERNAL MEDICINE | Admitting: INTERNAL MEDICINE
Payer: COMMERCIAL

## 2024-05-07 VITALS
OXYGEN SATURATION: 94 % | HEART RATE: 55 BPM | SYSTOLIC BLOOD PRESSURE: 108 MMHG | DIASTOLIC BLOOD PRESSURE: 71 MMHG | TEMPERATURE: 97.4 F | RESPIRATION RATE: 18 BRPM

## 2024-05-07 LAB — UPPER GI ENDOSCOPY: NORMAL

## 2024-05-07 PROCEDURE — 43239 EGD BIOPSY SINGLE/MULTIPLE: CPT | Performed by: INTERNAL MEDICINE

## 2024-05-07 PROCEDURE — 88305 TISSUE EXAM BY PATHOLOGIST: CPT | Mod: TC | Performed by: INTERNAL MEDICINE

## 2024-05-07 PROCEDURE — 88305 TISSUE EXAM BY PATHOLOGIST: CPT | Mod: 26 | Performed by: PATHOLOGY

## 2024-05-07 RX ORDER — PROCHLORPERAZINE MALEATE 10 MG
10 TABLET ORAL EVERY 6 HOURS PRN
Status: DISCONTINUED | OUTPATIENT
Start: 2024-05-07 | End: 2024-05-08 | Stop reason: HOSPADM

## 2024-05-07 RX ORDER — FLUMAZENIL 0.1 MG/ML
0.2 INJECTION, SOLUTION INTRAVENOUS
Status: DISCONTINUED | OUTPATIENT
Start: 2024-05-07 | End: 2024-05-08 | Stop reason: HOSPADM

## 2024-05-07 RX ORDER — NALOXONE HYDROCHLORIDE 0.4 MG/ML
0.4 INJECTION, SOLUTION INTRAMUSCULAR; INTRAVENOUS; SUBCUTANEOUS
Status: DISCONTINUED | OUTPATIENT
Start: 2024-05-07 | End: 2024-05-08 | Stop reason: HOSPADM

## 2024-05-07 RX ORDER — ONDANSETRON 2 MG/ML
4 INJECTION INTRAMUSCULAR; INTRAVENOUS EVERY 6 HOURS PRN
Status: DISCONTINUED | OUTPATIENT
Start: 2024-05-07 | End: 2024-05-08 | Stop reason: HOSPADM

## 2024-05-07 RX ORDER — NALOXONE HYDROCHLORIDE 0.4 MG/ML
0.2 INJECTION, SOLUTION INTRAMUSCULAR; INTRAVENOUS; SUBCUTANEOUS
Status: DISCONTINUED | OUTPATIENT
Start: 2024-05-07 | End: 2024-05-08 | Stop reason: HOSPADM

## 2024-05-07 RX ORDER — LIDOCAINE 40 MG/G
CREAM TOPICAL
Status: DISCONTINUED | OUTPATIENT
Start: 2024-05-07 | End: 2024-05-07 | Stop reason: HOSPADM

## 2024-05-07 RX ORDER — ONDANSETRON 4 MG/1
4 TABLET, ORALLY DISINTEGRATING ORAL EVERY 6 HOURS PRN
Status: DISCONTINUED | OUTPATIENT
Start: 2024-05-07 | End: 2024-05-08 | Stop reason: HOSPADM

## 2024-05-07 RX ORDER — ONDANSETRON 2 MG/ML
4 INJECTION INTRAMUSCULAR; INTRAVENOUS
Status: DISCONTINUED | OUTPATIENT
Start: 2024-05-07 | End: 2024-05-07 | Stop reason: HOSPADM

## 2024-05-07 RX ORDER — FENTANYL CITRATE 50 UG/ML
INJECTION, SOLUTION INTRAMUSCULAR; INTRAVENOUS DAILY PRN
Status: DISCONTINUED | OUTPATIENT
Start: 2024-05-07 | End: 2024-05-07 | Stop reason: HOSPADM

## 2024-05-07 RX ORDER — SODIUM CHLORIDE, SODIUM LACTATE, POTASSIUM CHLORIDE, CALCIUM CHLORIDE 600; 310; 30; 20 MG/100ML; MG/100ML; MG/100ML; MG/100ML
INJECTION, SOLUTION INTRAVENOUS CONTINUOUS
Status: DISCONTINUED | OUTPATIENT
Start: 2024-05-07 | End: 2024-05-07 | Stop reason: HOSPADM

## 2024-05-07 NOTE — H&P
Melly Huston  [id number]  female  58 year old      Reason for procedure/surgery: chronic cough, evaluate for evidence of reflux    Patient Active Problem List   Diagnosis    Endometrial cancer (H)    OA (osteoarthritis) of knee    Pain of right scapula    Shoulder pain, right       Past Surgical History:    Past Surgical History:   Procedure Laterality Date    ARTHROPLASTY KNEE Left 2015    Procedure: ARTHROPLASTY KNEE;  Surgeon: Magan No MD;  Location:  OR    COLONOSCOPY N/A 2023    Procedure: Colonoscopy;  Surgeon: Nickie Jiménez MD;  Location: [facility] OR    DAVINCI HYSTERECTOMY TOTAL, BILATERAL SALPINGO-OOPHORECTOMY, NODE DISSECTION, COMBINED  2013    Procedure: COMBINED DAVINCI HYSTERECTOMY TOTAL, SALPINGO-OOPHORECTOMY, NODE DISSECTION;  Davinci Total Laparoscopic Hysterectomy, Bilateral Salpingo-Oophorectomy, Bilateral pelvic and periaortic Lymph Node Dissection, cystoscopy and washings.  Anesthesia General with Pain block ;  Surgeon: Bonny Cruz MD;  Location: [facility] OR    GYN SURGERY  2013    KNEE SURGERY  1998    left acl    REMOVE HARDWARE KNEE  2015    Procedure: REMOVE HARDWARE KNEE;  Surgeon: Magan No MD;  Location:  OR    ROTATOR CUFF REPAIR RT/LT  2012    Right    WRIST SURGERY  2011    blood clot, right       Past Medical History:   Past Medical History:   Diagnosis Date    Arthritis     Blood clot in vein     negative coag testing.     Endometrial cancer (H)     PONV (postoperative nausea and vomiting)        Social History:   Social History     Tobacco Use    Smoking status: Former     Current packs/day: 0.00     Types: Cigarettes     Quit date: 1985     Years since quittin.3    Smokeless tobacco: Never   Substance Use Topics    Alcohol use: Yes     Comment: weekends       Family History:   Family History   Problem Relation Age of Onset    Arthritis Mother     Cerebrovascular Disease Mother     Lipids  Mother     Diabetes Mother     Kidney Cancer Mother         kidney    Hyperlipidemia Mother     Other Cancer Mother         Kidney    Arthritis Father     Peptic Ulcer Disease Father     Lipids Father     Prostate Cancer Father         stage 4    Hyperlipidemia Father     Diabetes Brother     Cancer Maternal Aunt         brain cancer, lung cancer    Lung Cancer Maternal Aunt     Brain Cancer Maternal Aunt     Prostate Cancer Brother          brother, prostate    Hypertension Brother     Cancer Paternal Uncle         prostate x 2    Prostate Cancer Brother        Allergies: No Known Allergies    Active Medications:   Current Outpatient Medications   Medication Sig Dispense Refill    CALCIUM PO       Multiple Vitamins-Minerals (MULTIVITAMIN PO) Take by mouth daily      VITAMIN D, CHOLECALCIFEROL, PO Take by mouth daily      atorvastatin (LIPITOR) 10 MG tablet Take 1 tablet (10 mg) by mouth daily (Patient not taking: Reported on 4/29/2024) 90 tablet 3       Systemic Review:   ROS otherwise negative    Physical Examination:   Vital Signs: /88   Pulse 64   Temp (!) 96.7  F (35.9  C) (Temporal)   Resp 16   LMP 07/10/2013   GENERAL: healthy, alert and no distress  HENT: oropharynx clear and oral mucous membranes moist, Mallampati II  NECK: Normal ROM  RESP: lungs clear to auscultation - no rales, rhonchi or wheezes  CV: regular rate and rhythm, normal S1 S2,   ABDOMEN: soft, nontender, and bowel sounds normal  MS: no gross musculoskeletal defects noted, no edema      Plan: Appropriate to proceed as scheduled.      Kiki Azul MD  5/7/2024    PCP:  No Ref-Primary, Physician

## 2024-05-08 LAB
PATH REPORT.COMMENTS IMP SPEC: NORMAL
PATH REPORT.COMMENTS IMP SPEC: NORMAL
PATH REPORT.FINAL DX SPEC: NORMAL
PATH REPORT.GROSS SPEC: NORMAL
PATH REPORT.MICROSCOPIC SPEC OTHER STN: NORMAL
PATH REPORT.RELEVANT HX SPEC: NORMAL
PHOTO IMAGE: NORMAL

## 2024-05-13 ENCOUNTER — OFFICE VISIT (OUTPATIENT)
Dept: PULMONOLOGY | Facility: CLINIC | Age: 59
End: 2024-05-13
Attending: INTERNAL MEDICINE
Payer: COMMERCIAL

## 2024-05-13 DIAGNOSIS — K21.9 GASTROESOPHAGEAL REFLUX DISEASE, UNSPECIFIED WHETHER ESOPHAGITIS PRESENT: ICD-10-CM

## 2024-05-13 LAB
DLCOUNC-%PRED-PRE: 101 %
DLCOUNC-PRE: 20.19 ML/MIN/MMHG
DLCOUNC-PRED: 19.79 ML/MIN/MMHG
ERV-%PRED-PRE: 69 %
ERV-PRE: 0.76 L
ERV-PRED: 1.09 L
EXPTIME-PRE: 6.94 SEC
FEF2575-%PRED-PRE: 65 %
FEF2575-PRE: 1.42 L/SEC
FEF2575-PRED: 2.17 L/SEC
FEFMAX-%PRED-PRE: 84 %
FEFMAX-PRE: 5.31 L/SEC
FEFMAX-PRED: 6.32 L/SEC
FEV1-%PRED-PRE: 84 %
FEV1-PRE: 1.98 L
FEV1FEV6-PRE: 73 %
FEV1FEV6-PRED: 81 %
FEV1FVC-PRE: 73 %
FEV1FVC-PRED: 80 %
FEV1SVC-PRE: 73 %
FEV1SVC-PRED: 72 %
FIFMAX-PRE: 4.68 L/SEC
FRCPLETH-%PRED-PRE: 97 %
FRCPLETH-PRE: 2.64 L
FRCPLETH-PRED: 2.7 L
FVC-%PRED-PRE: 92 %
FVC-PRE: 2.72 L
FVC-PRED: 2.93 L
IC-%PRED-PRE: 89 %
IC-PRE: 1.95 L
IC-PRED: 2.19 L
RVPLETH-%PRED-PRE: 99 %
RVPLETH-PRE: 1.88 L
RVPLETH-PRED: 1.9 L
TLCPLETH-%PRED-PRE: 92 %
TLCPLETH-PRE: 4.59 L
TLCPLETH-PRED: 4.94 L
VA-%PRED-PRE: 91 %
VA-PRE: 4.31 L
VC-%PRED-PRE: 83 %
VC-PRE: 2.71 L
VC-PRED: 3.25 L

## 2024-05-13 PROCEDURE — 94726 PLETHYSMOGRAPHY LUNG VOLUMES: CPT | Performed by: INTERNAL MEDICINE

## 2024-05-13 PROCEDURE — 94729 DIFFUSING CAPACITY: CPT | Performed by: INTERNAL MEDICINE

## 2024-05-13 PROCEDURE — 94375 RESPIRATORY FLOW VOLUME LOOP: CPT | Performed by: INTERNAL MEDICINE

## 2024-05-20 ENCOUNTER — ANCILLARY PROCEDURE (OUTPATIENT)
Dept: MAMMOGRAPHY | Facility: CLINIC | Age: 59
End: 2024-05-20
Payer: COMMERCIAL

## 2024-05-20 DIAGNOSIS — Z12.31 VISIT FOR SCREENING MAMMOGRAM: ICD-10-CM

## 2024-05-20 PROCEDURE — 77067 SCR MAMMO BI INCL CAD: CPT | Mod: GC

## 2024-06-07 ENCOUNTER — TELEPHONE (OUTPATIENT)
Dept: INTERNAL MEDICINE | Facility: CLINIC | Age: 59
End: 2024-06-07
Payer: COMMERCIAL

## 2024-06-07 NOTE — TELEPHONE ENCOUNTER
Left Voicemail (1st Attempt) for the patient to call back and schedule the following:    Appointment type: RTN  Provider: Can see Rachel Washington, any PCC or PCP next available   Return date: 6/11/2024 or later  Specialty phone number: [phone]  Additional appointment(s) needed: -  Additonal Notes: 6/11/2024 PCP OOO rs needed- PCP next available is Sept can see another provider

## 2024-07-31 ENCOUNTER — TELEPHONE (OUTPATIENT)
Dept: INTERNAL MEDICINE | Facility: CLINIC | Age: 59
End: 2024-07-31
Payer: COMMERCIAL

## 2024-07-31 NOTE — TELEPHONE ENCOUNTER
Patient confirmed scheduled appointment:  Date: 8/30/2024  Time: 800am  Visit type: in person  Provider: Dr. Duong  Location: [address] [place] [address]  Testing/imaging: -  Additional notes: rs from 9/10 due to provider OOO

## 2024-08-11 ENCOUNTER — HEALTH MAINTENANCE LETTER (OUTPATIENT)
Age: 59
End: 2024-08-11

## 2024-08-29 NOTE — PROGRESS NOTES
HPI;    She has been working on exercise and diet to decrease her weight and lower her cholesterol. Overall she feels quite good and denies any new HEENT, cardiopulmonary, abdominal, , GYN, neurological, systemic, psychiatric, lymphatic, endocrine, vascular complaints. She denies any significant musculoskeletal complaints. She feels she may be building muscle mass.         Past Medical History:   Diagnosis Date    Arthritis     Blood clot in vein 2010    negative coag testing.     Endometrial cancer (H)     PONV (postoperative nausea and vomiting)      Past Surgical History:   Procedure Laterality Date    ARTHROPLASTY KNEE Left 03/24/2015    Procedure: ARTHROPLASTY KNEE;  Surgeon: Magan No MD;  Location:  OR    COLONOSCOPY N/A 07/19/2023    Procedure: Colonoscopy;  Surgeon: Nickie Jiménez MD;  [place] [facility] [place]    DAVINCI HYSTERECTOMY TOTAL, BILATERAL SALPINGO-OOPHORECTOMY, NODE DISSECTION, COMBINED  08/19/2013    Procedure: COMBINED DAVINCI HYSTERECTOMY TOTAL, SALPINGO-OOPHORECTOMY, NODE DISSECTION;  Davinci Total Laparoscopic Hysterectomy, Bilateral Salpingo-Oophorectomy, Bilateral pelvic and periaortic Lymph Node Dissection, cystoscopy and washings.  Anesthesia General with Pain block ;  Surgeon: Bonny Cruz MD;  Location: [facility] OR    ESOPHAGOSCOPY, GASTROSCOPY, DUODENOSCOPY (EGD), COMBINED N/A 5/7/2024    Procedure: Esophagoscopy, gastroscopy, duodenoscopy (EGD), combined with biopsy;  Surgeon: Kiki Azul MD;  [place] [facility] [place]    GYN SURGERY  8/2013    KNEE SURGERY  01/01/1998    left acl    REMOVE HARDWARE KNEE  03/24/2015    Procedure: REMOVE HARDWARE KNEE;  Surgeon: Magan No MD;  Location:  OR    ROTATOR CUFF REPAIR RT/LT  01/01/2012    Right    WRIST SURGERY  01/01/2011    blood clot, right     PE:    Vitals noted, gen, nad, cooperative, alert, neck supple nl rom, lungs with good air movement, RRR, S1, S2, no MRG, abdomen, no acute findings. Grossly  normal neurological exam.     Results for orders placed or performed in visit on 08/30/24   Lipid panel reflex to direct LDL Fasting     Status: Abnormal   Result Value Ref Range    Cholesterol 296 (H) <200 mg/dL    Triglycerides 233 (H) <150 mg/dL    Direct Measure HDL 66 >=50 mg/dL    LDL Cholesterol Calculated 183 (H) <=100 mg/dL    Non HDL Cholesterol 230 (H) <130 mg/dL    Patient Fasting > 8hrs? Yes     Narrative    Cholesterol  Desirable:  <200 mg/dL    Triglycerides  Normal:  Less than 150 mg/dL  Borderline High:  150-199 mg/dL  High:  200-499 mg/dL  Very High:  Greater than or equal to 500 mg/dL    Direct Measure HDL  Female:  Greater than or equal to 50 mg/dL   Male:  Greater than or equal to 40 mg/dL    LDL Cholesterol  Desirable:  <100mg/dL  Above Desirable:  100-129 mg/dL   Borderline High:  130-159 mg/dL   High:  160-189 mg/dL   Very High:  >= 190 mg/dL    Non HDL Cholesterol  Desirable:  130 mg/dL  Above Desirable:  130-159 mg/dL  Borderline High:  160-189 mg/dL  High:  190-219 mg/dL  Very High:  Greater than or equal to 220 mg/dL   Comprehensive metabolic panel     Status: Abnormal   Result Value Ref Range    Sodium 141 135 - 145 mmol/L    Potassium 4.1 3.4 - 5.3 mmol/L    Carbon Dioxide (CO2) 23 22 - 29 mmol/L    Anion Gap 11 7 - 15 mmol/L    Urea Nitrogen 18.1 8.0 - 23.0 mg/dL    Creatinine 0.79 0.51 - 0.95 mg/dL    GFR Estimate 86 >60 mL/min/1.73m2    Calcium 9.1 8.8 - 10.4 mg/dL    Chloride 107 98 - 107 mmol/L    Glucose 103 (H) 70 - 99 mg/dL    Alkaline Phosphatase 77 40 - 150 U/L    AST 18 0 - 45 U/L    ALT 15 0 - 50 U/L    Protein Total 6.6 6.4 - 8.3 g/dL    Albumin 4.2 3.5 - 5.2 g/dL    Bilirubin Total 0.4 <=1.2 mg/dL    Patient Fasting > 8hrs? Yes    CBC with platelets and differential     Status: Abnormal   Result Value Ref Range    WBC Count 3.8 (L) 4.0 - 11.0 10e3/uL    RBC Count 3.95 3.80 - 5.20 10e6/uL    Hemoglobin 13.0 11.7 - 15.7 g/dL    Hematocrit 38.7 35.0 - 47.0 %    MCV 98 78 -  100 fL    MCH 32.9 26.5 - 33.0 pg    MCHC 33.6 31.5 - 36.5 g/dL    RDW 12.9 10.0 - 15.0 %    Platelet Count 223 150 - 450 10e3/uL    % Neutrophils 54 %    % Lymphocytes 25 %    % Monocytes 9 %    % Eosinophils 10 %    % Basophils 2 %    % Immature Granulocytes 0 %    NRBCs per 100 WBC 0 <1 /100    Absolute Neutrophils 2.0 1.6 - 8.3 10e3/uL    Absolute Lymphocytes 1.0 0.8 - 5.3 10e3/uL    Absolute Monocytes 0.3 0.0 - 1.3 10e3/uL    Absolute Eosinophils 0.4 0.0 - 0.7 10e3/uL    Absolute Basophils 0.1 0.0 - 0.2 10e3/uL    Absolute Immature Granulocytes 0.0 <=0.4 10e3/uL    Absolute NRBCs 0.0 10e3/uL   CBC with platelets and differential     Status: Abnormal    Narrative    The following orders were created for panel order CBC with platelets and differential.  Procedure                               Abnormality         Status                     ---------                               -----------         ------                     CBC with platelets and d...[[id number]]  Abnormal            Final result                 Please view results for these tests on the individual orders.         A/P:    1. Mammogram was done 5/20/2024 and she get this yearly.   2. Immunizations; Pfizer COVID x 2. Moderna x 1. Td/Tap 3/19/2015. Check with insurance regarding Shingrix She declines all other vaccines.   3. Colonoscopy; 10/31/2013 and repeat in 5 years. Repeat 7/19/2023 was done and repeat in 10 years.   4. Bone density; she does not want a DEXA scan but wants Vit D checked 10/30/2023 low normal at 25  5. Dermatology; she has an appt. With Dr. Jo 11/26/2024.    6. Lipids; 10/30/2023; TG's 277,  and HDL 61. She has been on  Atorvastatin in the past  7. Chest wall pain and mass? Pelvic pain and h/o endometrial cancer. She was seen in GYN clinic 6/13/2023.  CT chest/adomen/pelvis was done 5/11/2023 that was unremarkable. Placed GYN referral today.   8. Reflux and some cough. She had CT chest/abdomen/pelvis imaging done  5/11/2023. She can try OTC antacid and may  . Placed  ENT for direct laryngoscopy. (1/4/2024).  She has not used.empiric PPI or H2 blocker she can try. EGD was done 5/13/2024.   9. Glucose A1c was 5.5% on 10/30/2023. Ordered A1c on 8/30/2024.   10. Placed Audiology referral today 8/30/2024.   11. Placed Ophthalmology referral today 8/30/2024.      30 minutes spent on the date of the encounter doing chart review, history and exam, documentation and further activities as noted above

## 2024-08-30 ENCOUNTER — OFFICE VISIT (OUTPATIENT)
Dept: INTERNAL MEDICINE | Facility: CLINIC | Age: 59
End: 2024-08-30
Payer: COMMERCIAL

## 2024-08-30 ENCOUNTER — LAB (OUTPATIENT)
Dept: LAB | Facility: CLINIC | Age: 59
End: 2024-08-30
Payer: COMMERCIAL

## 2024-08-30 VITALS
DIASTOLIC BLOOD PRESSURE: 75 MMHG | OXYGEN SATURATION: 99 % | HEIGHT: 64 IN | SYSTOLIC BLOOD PRESSURE: 112 MMHG | BODY MASS INDEX: 26.63 KG/M2 | WEIGHT: 156 LBS | HEART RATE: 68 BPM

## 2024-08-30 DIAGNOSIS — H91.90 DECREASED HEARING, UNSPECIFIED LATERALITY: ICD-10-CM

## 2024-08-30 DIAGNOSIS — E78.00 HIGH BLOOD CHOLESTEROL: ICD-10-CM

## 2024-08-30 DIAGNOSIS — C54.1 ENDOMETRIAL CANCER (H): ICD-10-CM

## 2024-08-30 DIAGNOSIS — H53.9 VISION CHANGES: ICD-10-CM

## 2024-08-30 DIAGNOSIS — C54.1 ENDOMETRIAL CANCER (H): Primary | ICD-10-CM

## 2024-08-30 LAB
ALBUMIN SERPL BCG-MCNC: 4.2 G/DL (ref 3.5–5.2)
ALP SERPL-CCNC: 77 U/L (ref 40–150)
ALT SERPL W P-5'-P-CCNC: 15 U/L (ref 0–50)
ANION GAP SERPL CALCULATED.3IONS-SCNC: 11 MMOL/L (ref 7–15)
AST SERPL W P-5'-P-CCNC: 18 U/L (ref 0–45)
BASOPHILS # BLD AUTO: 0.1 10E3/UL (ref 0–0.2)
BASOPHILS NFR BLD AUTO: 2 %
BILIRUB SERPL-MCNC: 0.4 MG/DL
BUN SERPL-MCNC: 18.1 MG/DL (ref 8–23)
CALCIUM SERPL-MCNC: 9.1 MG/DL (ref 8.8–10.4)
CHLORIDE SERPL-SCNC: 107 MMOL/L (ref 98–107)
CHOLEST SERPL-MCNC: 296 MG/DL
CREAT SERPL-MCNC: 0.79 MG/DL (ref 0.51–0.95)
EGFRCR SERPLBLD CKD-EPI 2021: 86 ML/MIN/1.73M2
EOSINOPHIL # BLD AUTO: 0.4 10E3/UL (ref 0–0.7)
EOSINOPHIL NFR BLD AUTO: 10 %
ERYTHROCYTE [DISTWIDTH] IN BLOOD BY AUTOMATED COUNT: 12.9 % (ref 10–15)
FASTING STATUS PATIENT QL REPORTED: YES
FASTING STATUS PATIENT QL REPORTED: YES
GLUCOSE SERPL-MCNC: 103 MG/DL (ref 70–99)
HBA1C MFR BLD: 5.3 %
HCO3 SERPL-SCNC: 23 MMOL/L (ref 22–29)
HCT VFR BLD AUTO: 38.7 % (ref 35–47)
HDLC SERPL-MCNC: 66 MG/DL
HGB BLD-MCNC: 13 G/DL (ref 11.7–15.7)
IMM GRANULOCYTES # BLD: 0 10E3/UL
IMM GRANULOCYTES NFR BLD: 0 %
LDLC SERPL CALC-MCNC: 183 MG/DL
LYMPHOCYTES # BLD AUTO: 1 10E3/UL (ref 0.8–5.3)
LYMPHOCYTES NFR BLD AUTO: 25 %
MCH RBC QN AUTO: 32.9 PG (ref 26.5–33)
MCHC RBC AUTO-ENTMCNC: 33.6 G/DL (ref 31.5–36.5)
MCV RBC AUTO: 98 FL (ref 78–100)
MONOCYTES # BLD AUTO: 0.3 10E3/UL (ref 0–1.3)
MONOCYTES NFR BLD AUTO: 9 %
NEUTROPHILS # BLD AUTO: 2 10E3/UL (ref 1.6–8.3)
NEUTROPHILS NFR BLD AUTO: 54 %
NONHDLC SERPL-MCNC: 230 MG/DL
NRBC # BLD AUTO: 0 10E3/UL
NRBC BLD AUTO-RTO: 0 /100
PLATELET # BLD AUTO: 223 10E3/UL (ref 150–450)
POTASSIUM SERPL-SCNC: 4.1 MMOL/L (ref 3.4–5.3)
PROT SERPL-MCNC: 6.6 G/DL (ref 6.4–8.3)
RBC # BLD AUTO: 3.95 10E6/UL (ref 3.8–5.2)
SODIUM SERPL-SCNC: 141 MMOL/L (ref 135–145)
TRIGL SERPL-MCNC: 233 MG/DL
WBC # BLD AUTO: 3.8 10E3/UL (ref 4–11)

## 2024-08-30 PROCEDURE — 83036 HEMOGLOBIN GLYCOSYLATED A1C: CPT | Performed by: INTERNAL MEDICINE

## 2024-08-30 PROCEDURE — 99214 OFFICE O/P EST MOD 30 MIN: CPT | Performed by: INTERNAL MEDICINE

## 2024-08-30 PROCEDURE — 80061 LIPID PANEL: CPT | Performed by: PATHOLOGY

## 2024-08-30 PROCEDURE — 99000 SPECIMEN HANDLING OFFICE-LAB: CPT | Performed by: PATHOLOGY

## 2024-08-30 PROCEDURE — 85025 COMPLETE CBC W/AUTO DIFF WBC: CPT | Performed by: PATHOLOGY

## 2024-08-30 PROCEDURE — 36415 COLL VENOUS BLD VENIPUNCTURE: CPT | Performed by: PATHOLOGY

## 2024-08-30 PROCEDURE — 80053 COMPREHEN METABOLIC PANEL: CPT | Performed by: PATHOLOGY

## 2024-08-30 NOTE — PROGRESS NOTES
Melly is a 58 year old that presents in clinic today for the following:     Chief Complaint   Patient presents with    Follow Up     Follow up and cholesterol check            8/30/2024     7:55 AM   Additional Questions   Roomed by RH     Screenings from encounters over the past 10 days    No data recorded       Branden Altamirano at 8:00 AM on 8/30/2024

## 2024-10-09 NOTE — TELEPHONE ENCOUNTER
Pre assessment completed for upcoming procedure.   (Please see previous telephone encounter notes for complete details)      Procedure details:    Arrival time and facility location reviewed.    Pre op exam needed? N/A    Designated  policy reviewed. Instructed to have someone stay 6 hours post procedure.       Medication review:    Medications reviewed. Please see supporting documentation below. Holding recommendations discussed (if applicable).       Prep for procedure:     Procedure prep instructions reviewed.        Any additional information needed:  N/A      Patient verbalized understanding and had no questions or concerns at this time.      Jaimee Clark RN  Endoscopy Procedure Pre Assessment RN  [phone] option 4  
Pre visit planning completed.      Procedure details:    Patient scheduled for Upper endoscopy (EGD) on 5/7/24.     Arrival time: 1400. Procedure time 1500    Facility location: [facility]; [address] [place] [address], [address], [place] [address]. Check in location: 5th Floor.    Sedation type: Conscious sedation     Pre op exam needed? N/A    Indication for procedure:   Gastroesophageal reflux disease, unspecified whether esophagitis present            Chart review:     Electronic implanted devices? No    Recent diagnosis of diverticulitis within the last 6 weeks? No    Diabetic? No      Medication review:    Anticoagulants? No    NSAIDS? No    Other medication HOLDING recommendations:  N/A      Prep for procedure:     Prep instructions sent via COM DEV         Melly Canchola RN  Endoscopy Procedure Pre Assessment RN  [phone] option 4  
1100

## 2024-11-08 ENCOUNTER — TELEPHONE (OUTPATIENT)
Dept: OPHTHALMOLOGY | Facility: CLINIC | Age: 59
End: 2024-11-08
Payer: COMMERCIAL

## 2024-11-11 NOTE — PROGRESS NOTES
AUDIOLOGY REPORT    SUBJECTIVE:  Melly Huston is a 59 year old female who was seen on 11/20/24 in the Audiology Clinic at the [facility] for audiologic evaluation, referred by Julius uDong M.D.     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  She denies significant hearing concerns, tinnitus, dizziness, ear pain, drainage from ears,  aural fullness, or history of ear surgery.  She had radiation treatment for endometrial cancer in 2013 but did not have chemotherapy.     OBJECTIVE:  Abuse Screening:  Do you feel unsafe at home or work/school? No  Do you feel threatened by someone? No  Does anyone try to keep you from having contact with others, or doing things outside of your home? No  Physical signs of abuse present? No     Fall Risk Screen:  1. Have you fallen two or more times in the past year? No  2. Have you fallen and had an injury in the past year? No    Timed Up and Go Score (in seconds): not tested  Is patient a fall risk? No  Referral initiated: No  Fall Risk Assessment Completed by Audiology    Otoscopic exam indicates ears are clear of cerumen bilaterally.      Pure Tone Thresholds assessed using conventional audiometry with good reliability from 250-8000 Hz bilaterally using insert earphones and circumaural headphones.     RIGHT:  Normal/borderline normal hearing.     LEFT:  Normal hearing.     Tympanogram:    RIGHT: normal eardrum mobility    LEFT:   normal eardrum mobility    Reflexes (reported by stimulus ear):  RIGHT: Ipsilateral is absent at frequencies tested  RIGHT: Contralateral is elevated at frequencies tested  LEFT:   Ipsilateral is present at normal levels  LEFT:   Contralateral is absent at frequencies tested      Speech Reception Threshold:    RIGHT: 20 dB HL    LEFT:   20 dB HL  Word Recognition Score:     RIGHT: 100% at 60 dB HL using NU-6 recorded word list.    LEFT:   100% at 60 dB HL using NU-6  recorded word list.      ASSESSMENT:   -Essentially normal hearing in both ears with normal tympanograms bilaterally.  -History of noise exposure.     Today s results were discussed with the patient in detail.     PLAN:    Recheck hearing if changes.    Encouraged use of hearing protection.   Follow up with PCP.      The patient expressed understanding and agreement with this plan.    Dennise Pérez, CCC-A, [facility] [place]  Licensed Audiologist  [place] #[id number]     Julius Duong M.D.

## 2024-11-13 ENCOUNTER — OFFICE VISIT (OUTPATIENT)
Dept: OPHTHALMOLOGY | Facility: CLINIC | Age: 59
End: 2024-11-13
Attending: INTERNAL MEDICINE
Payer: COMMERCIAL

## 2024-11-13 DIAGNOSIS — Z87.81 HISTORY OF FRACTURE OF ORBIT: ICD-10-CM

## 2024-11-13 DIAGNOSIS — H52.4 PRESBYOPIA OF BOTH EYES: ICD-10-CM

## 2024-11-13 DIAGNOSIS — H53.9 VISION CHANGES: ICD-10-CM

## 2024-11-13 DIAGNOSIS — H21.552 RECESSION OF CHAMBER ANGLE OF LEFT EYE: Primary | ICD-10-CM

## 2024-11-13 DIAGNOSIS — H91.90 DECREASED HEARING, UNSPECIFIED LATERALITY: ICD-10-CM

## 2024-11-13 DIAGNOSIS — C54.1 ENDOMETRIAL CANCER (H): ICD-10-CM

## 2024-11-13 PROCEDURE — 92004 COMPRE OPH EXAM NEW PT 1/>: CPT | Performed by: OPTOMETRIST

## 2024-11-13 PROCEDURE — 92133 CPTRZD OPH DX IMG PST SGM ON: CPT | Performed by: OPTOMETRIST

## 2024-11-13 PROCEDURE — 92020 GONIOSCOPY: CPT | Mod: 59 | Performed by: OPTOMETRIST

## 2024-11-13 ASSESSMENT — EXTERNAL EXAM - LEFT EYE: OS_EXAM: NORMAL

## 2024-11-13 ASSESSMENT — CUP TO DISC RATIO
OS_RATIO: 0.35
OD_RATIO: 0.35

## 2024-11-13 ASSESSMENT — TONOMETRY
IOP_METHOD: ICARE
OD_IOP_MMHG: 16
OS_IOP_MMHG: 21

## 2024-11-13 ASSESSMENT — VISUAL ACUITY
OS_PH_SC+: +1
OS_SC: 20/30-/+
OS_PH_SC: 20/25
OD_SC: 20/25-
METHOD: SNELLEN - LINEAR

## 2024-11-13 ASSESSMENT — GONIOSCOPY
OD_NASAL: SS
METHOD: ZEISS, FOUR MIRROR
OS_INFERIOR: CB
OD_INFERIOR: SS
OS_SUPERIOR: CB
OS_NASAL: CB
OS_TEMPORAL: CB

## 2024-11-13 ASSESSMENT — CONF VISUAL FIELD
OS_NORMAL: 1
OD_SUPERIOR_TEMPORAL_RESTRICTION: 0
OD_INFERIOR_TEMPORAL_RESTRICTION: 0
OD_SUPERIOR_NASAL_RESTRICTION: 0
OS_SUPERIOR_NASAL_RESTRICTION: 0
OD_NORMAL: 1
OS_SUPERIOR_TEMPORAL_RESTRICTION: 0
OD_INFERIOR_NASAL_RESTRICTION: 0
METHOD: COUNTING FINGERS
OS_INFERIOR_NASAL_RESTRICTION: 0
OS_INFERIOR_TEMPORAL_RESTRICTION: 0

## 2024-11-13 ASSESSMENT — SLIT LAMP EXAM - LIDS
COMMENTS: NORMAL
COMMENTS: NORMAL

## 2024-11-13 ASSESSMENT — EXTERNAL EXAM - RIGHT EYE: OD_EXAM: NORMAL

## 2024-11-13 NOTE — PROGRESS NOTES
History  HPI       Annual Eye Exam    In both eyes.  Onset was gradual.  This started years ago.  Presenting in central vision.  Charactertized as  blurred vision.  Severity is mild.  Occurring constantly.  It is worse throughout the day.  Context:  distance vision, mid-range vision and near vision.  Since onset it is gradually worsening.  Associated symptoms include flashes (in the morning occasionally) and floaters (long standing).  Treatments tried include glasses (readers).  Response to treatment was significant improvement.  Pain was noted as 0/10.             Comments    Downtime forms were used for this visit      Pt here for eye health exam.  She states she hasn't had an eye exam in many years.    Hx: baseball injury to one of her eyes in the 90's, unsure of laterality, probably left?     Vision slightly blurred distance but doesn't want to wear glasses, happy just wearing cheaters for near.    Oc meds: none  Oc sx: none    ANAID Avendano 2:15 PM 11/13/2024              Last edited by Kayy Robertson on 11/13/2024  2:21 PM.          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 No treatment indicated at this time. Monitor annually.    (H52.4) Presbyopia of both eyes  Comment: Happy with reading glasses, declined refraction  Plan:  Monitor annually.    (C54.1) Endometrial cancer (H)  (H91.90) Decreased hearing, unspecified laterality  (H53.9) Vision changes  Comment: Referred for eye exam  Plan:  Copy of chart sent to Dr. Duong.    Return to clinic in 1 year for comprehensive eye exam.    Complete  Pt given written and verbal dc instructions. Verbalizes understanding of instructions. All questions answered. Pt ambulated steadily to exit.    documentation of historical and exam elements from today's encounter can  be found in the full encounter summary report (not reduplicated in this progress  note). I personally obtained the chief complaint(s) and history of present illness. I  confirmed and edited as necessary the review of systems, past medical/surgical  history, family history, social history, and examination findings as documented by  others; and I examined the patient myself. I personally reviewed the relevant tests,  images, and reports as documented above. I formulated and edited as necessary the  assessment and plan and discussed the findings and management plan with the  patient and family.    Omar Mello OD, FAAO

## 2024-11-20 ENCOUNTER — OFFICE VISIT (OUTPATIENT)
Dept: AUDIOLOGY | Facility: CLINIC | Age: 59
End: 2024-11-20
Payer: COMMERCIAL

## 2024-11-20 DIAGNOSIS — H53.9 VISION CHANGES: ICD-10-CM

## 2024-11-20 DIAGNOSIS — H91.90 DECREASED HEARING, UNSPECIFIED LATERALITY: ICD-10-CM

## 2024-11-20 DIAGNOSIS — Z01.10 NORMAL HEARING EXAM: Primary | ICD-10-CM

## 2024-11-20 DIAGNOSIS — C54.1 ENDOMETRIAL CANCER (H): ICD-10-CM

## 2024-11-20 DIAGNOSIS — Z77.122 EXPOSURE TO NOISE: ICD-10-CM

## 2024-11-26 ENCOUNTER — OFFICE VISIT (OUTPATIENT)
Dept: DERMATOLOGY | Facility: CLINIC | Age: 59
End: 2024-11-26
Attending: INTERNAL MEDICINE
Payer: COMMERCIAL

## 2024-11-26 DIAGNOSIS — L28.1 PRURIGO NODULARIS: ICD-10-CM

## 2024-11-26 DIAGNOSIS — D18.01 CHERRY ANGIOMA: ICD-10-CM

## 2024-11-26 DIAGNOSIS — L82.1 SEBORRHEIC KERATOSES: ICD-10-CM

## 2024-11-26 DIAGNOSIS — L30.1 DYSHIDROTIC ECZEMA: Primary | ICD-10-CM

## 2024-11-26 DIAGNOSIS — Z12.83 SKIN CANCER SCREENING: ICD-10-CM

## 2024-11-26 ASSESSMENT — PAIN SCALES - GENERAL: PAINLEVEL_OUTOF10: NO PAIN (0)

## 2024-11-26 NOTE — NURSING NOTE
Dermatology Rooming Note    Melly Huston's goals for this visit include:   Chief Complaint   Patient presents with    Derm Problem     FBSE - no areas of conern     Alma Joseph, EMT

## 2024-11-26 NOTE — PROGRESS NOTES
[facility] Dermatology Note  Encounter Date: Nov 26, 2024  Office Visit     Dermatology Problem List:  1. Dyshidrotic eczema on fingers   2. 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 Seborrheic keratosis on right eyebrow    ____________________________________________    Assessment & Plan:    #  Dyshidrotic eczema  Advised to avoid triggers and offered topical steroids that the patient declined a need for at this moment. Patient who will get in touch in another flare up occurs.      # Prurigo or folliculitis  Patient who declined a need to treat the nodules but was informed that topical steroids for prurigo or topical antibiotics for folliculitis can be considered.    # Seborrheic keratosis, early stage  No further management at this time    # Cherry angiomas  No further management at this time    Procedures Performed: none    Follow-up: prn for new or changing lesions    Staff and Medical Student:     Mar Mccabe  [place] student   Dr. Jo  ____________________________________________    CC: Derm Problem (FBSE - no areas of conern)    HPI:  Ms. Melly Huston is a 59 year old female who presents today as a new patient on advise from her primary care physician. The physician has advised her to do a general full skin check without raising any specific concerns.     Melly herself points out three areas of concern to her. She has since few years back noticed a brownish macule on her right eyebrow that does not cause her any discomfort. She has a few lumps on her scalp in the neck area that do not itch themselves but she feels them and itches them. She has the impression they always appear in the same areas. She also describes occasional flare up of her fingers on both hands with itching blisters.     She has no history of skin cancer of her own but mentions that her father might have had skin changes late in life of to her unknown character.     Patient is otherwise feeling  well, without additional skin concerns.    Labs:  None reviewed.    Physical Exam:  Vitals: LMP 07/10/2013   SKIN: Total skin excluding the undergarment areas was performed. The exam included the head/face, neck, both arms, chest, back, abdomen, both legs, digits and/or nails.   - On the right eyebrow there is a very light brownish macule measuring 1,5x1,5cm.  - In the scalp in the neck area there are 3 slightly flaking light reddish nodules of 0,5cm.  - On the right thumb and right index finger there are several dry lesions in the process of healing surrounded by dry erythematous skin.  - A few red papules of approximately 2x2mm on upper and lower extremities.  - No other lesions of concern on areas examined.     Medications:  Current Outpatient Medications   Medication Sig Dispense Refill    atorvastatin (LIPITOR) 10 MG tablet Take 1 tablet (10 mg) by mouth daily (Patient not taking: Reported on 4/29/2024) 90 tablet 3    CALCIUM PO  (Patient not taking: Reported on 8/30/2024)      Multiple Vitamins-Minerals (MULTIVITAMIN PO) Take by mouth daily (Patient not taking: Reported on 8/30/2024)      VITAMIN D, CHOLECALCIFEROL, PO Take by mouth daily (Patient not taking: Reported on 8/30/2024)       No current facility-administered medications for this visit.      Past Medical History:   Patient Active Problem List   Diagnosis    Endometrial cancer (H)    OA (osteoarthritis) of knee    Pain of right scapula    Shoulder pain, right     Past Medical History:   Diagnosis Date    Arthritis     Blood clot in vein 2010    negative coag testing.     Endometrial cancer (H)     PONV (postoperative nausea and vomiting)         CC Julius Duong MD  [address] [address] on close of this encounter.

## 2024-11-26 NOTE — LETTER
11/26/2024       RE: Melly Huston  [address] [facility] [address]     Dear Colleague,    Thank you for referring your patient, Melly Huston, to the [facility] DERMATOLOG[facility] [place] at [facility]. Please see a copy of my visit note below.    [facility] Dermatology Note  Encounter Date: Nov 26, 2024  Office Visit     Dermatology Problem List:  1. Dyshidrotic eczema on fingers   2. 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 Seborrheic keratosis on right eyebrow    ____________________________________________    Assessment & Plan:    #  Dyshidrotic eczema  Advised to avoid triggers and offered topical steroids that the patient declined a need for at this moment. Patient who will get in touch in another flare up occurs.      # Prurigo or folliculitis  Patient who declined a need to treat the nodules but was informed that topical steroids for prurigo or topical antibiotics for folliculitis can be considered.    # Seborrheic keratosis, early stage  No further management at this time    # Cherry angiomas  No further management at this time    Procedures Performed: none    Follow-up: prn for new or changing lesions    Staff and Medical Student:     Mar Mccabe - [place]. student   Dr. Jo  ____________________________________________    CC: Derm Problem (FBSE - no areas of conern)    HPI:  Ms. Melly Huston is a 59 year old female who presents today as a new patient on advise from her primary care physician. The physician has advised her to do a general full skin check without raising any specific concerns.     Melly herself points out three areas of concern to her. She has since few years back noticed a brownish macule on her right eyebrow that does not cause her any discomfort. She has a few lumps on her scalp in the neck area that do not itch themselves but she feels them and itches them. She has  the impression they always appear in the same areas. She also describes occasional flare up of her fingers on both hands with itching blisters.     She has no history of skin cancer of her own but mentions that her father might have had skin changes late in life of to her unknown character.     Patient is otherwise feeling well, without additional skin concerns.    Labs:  None reviewed.    Physical Exam:  Vitals: LMP 07/10/2013   SKIN: Total skin excluding the undergarment areas was performed. The exam included the head/face, neck, both arms, chest, back, abdomen, both legs, digits and/or nails.   - On the right eyebrow there is a very light brownish macule measuring 1,5x1,5cm.  - In the scalp in the neck area there are 3 slightly flaking light reddish nodules of 0,5cm.  - On the right thumb and right index finger there are several dry lesions in the process of healing surrounded by dry erythematous skin.  - A few red papules of approximately 2x2mm on upper and lower extremities.  - No other lesions of concern on areas examined.     Medications:  Current Outpatient Medications   Medication Sig Dispense Refill     atorvastatin (LIPITOR) 10 MG tablet Take 1 tablet (10 mg) by mouth daily (Patient not taking: Reported on 4/29/2024) 90 tablet 3     CALCIUM PO  (Patient not taking: Reported on 8/30/2024)       Multiple Vitamins-Minerals (MULTIVITAMIN PO) Take by mouth daily (Patient not taking: Reported on 8/30/2024)       VITAMIN D, CHOLECALCIFEROL, PO Take by mouth daily (Patient not taking: Reported on 8/30/2024)       No current facility-administered medications for this visit.      Past Medical History:   Patient Active Problem List   Diagnosis     Endometrial cancer (H)     OA (osteoarthritis) of knee     Pain of right scapula     Shoulder pain, right     Past Medical History:   Diagnosis Date     Arthritis      Blood clot in vein 2010    negative coag testing.      Endometrial cancer (H)      PONV (postoperative  nausea and vomiting)         CC Julius Duong MD  [address] [id number] on close of this encounter.     Attestation signed by Srinath Jo MD at 11/26/2024  4:35 PM:  I have personally examined this patient and agree with the resident doctor's documentation and plan of care. I have reviewed and amended the resident's note. The documentation accurately reflects my clinical observations, diagnoses, treatment and follow-up plans.     Srinath Jo MD  Dermatology Staff       Again, thank you for allowing me to participate in the care of your patient.      Sincerely,    Srinath Jo MD

## 2025-07-22 ENCOUNTER — ANCILLARY PROCEDURE (OUTPATIENT)
Dept: MAMMOGRAPHY | Facility: CLINIC | Age: 60
End: 2025-07-22
Payer: COMMERCIAL

## 2025-07-22 DIAGNOSIS — Z12.31 VISIT FOR SCREENING MAMMOGRAM: ICD-10-CM

## 2025-07-22 PROCEDURE — 77067 SCR MAMMO BI INCL CAD: CPT | Mod: GC | Performed by: STUDENT IN AN ORGANIZED HEALTH CARE EDUCATION/TRAINING PROGRAM

## 2025-07-22 PROCEDURE — 77063 BREAST TOMOSYNTHESIS BI: CPT | Mod: GC | Performed by: STUDENT IN AN ORGANIZED HEALTH CARE EDUCATION/TRAINING PROGRAM

## (undated) DEVICE — SUCTION MANIFOLD NEPTUNE 2 SYS 1 PORT 702-025-000

## (undated) DEVICE — SNARE CAPIVATOR ROUND COLD SNR BX10 M00561101

## (undated) DEVICE — GOWN IMPERVIOUS 2XL BLUE

## (undated) DEVICE — SPECIMEN CONTAINER 3OZ W/FORMALIN 59901

## (undated) DEVICE — SUCTION CATH AIRLIFE TRI-FLO W/CONTROL PORT 14FR  T60C

## (undated) DEVICE — KIT ENDO TURNOVER/PROCEDURE CARRY-ON 101822

## (undated) DEVICE — SOL WATER IRRIG 500ML BOTTLE 2F7113

## (undated) DEVICE — ENDO BITE BLOCK ADULT OMNI-BLOC

## (undated) DEVICE — SYR 30ML SLIP TIP W/O NDL 302833

## (undated) DEVICE — TUBING SUCTION MEDI-VAC 1/4"X20' N620A

## (undated) DEVICE — KIT ENDO FIRST STEP DISINFECTANT 200ML W/POUCH EP-4

## (undated) DEVICE — ENDO FORCEP BX CAPTURA PRO SPIKE G50696

## (undated) DEVICE — GLOVE EXAM NITRILE LG PF LATEX FREE 5064

## (undated) RX ORDER — FENTANYL CITRATE 50 UG/ML
INJECTION, SOLUTION INTRAMUSCULAR; INTRAVENOUS
Status: DISPENSED
Start: 2024-05-07